# Patient Record
Sex: FEMALE | Race: BLACK OR AFRICAN AMERICAN | NOT HISPANIC OR LATINO | Employment: PART TIME | URBAN - METROPOLITAN AREA
[De-identification: names, ages, dates, MRNs, and addresses within clinical notes are randomized per-mention and may not be internally consistent; named-entity substitution may affect disease eponyms.]

---

## 2017-01-13 ENCOUNTER — OFFICE VISIT (OUTPATIENT)
Dept: FAMILY MEDICINE CLINIC | Facility: CLINIC | Age: 62
End: 2017-01-13

## 2017-01-13 VITALS
DIASTOLIC BLOOD PRESSURE: 78 MMHG | WEIGHT: 198.5 LBS | HEIGHT: 67 IN | SYSTOLIC BLOOD PRESSURE: 110 MMHG | HEART RATE: 71 BPM | RESPIRATION RATE: 16 BRPM | BODY MASS INDEX: 31.16 KG/M2 | OXYGEN SATURATION: 98 % | TEMPERATURE: 97.8 F

## 2017-01-13 DIAGNOSIS — R50.81 FEVER IN OTHER DISEASES: Primary | ICD-10-CM

## 2017-01-13 DIAGNOSIS — R60.9 EDEMA, UNSPECIFIED TYPE: ICD-10-CM

## 2017-01-13 PROCEDURE — 99213 OFFICE O/P EST LOW 20 MIN: CPT | Performed by: INTERNAL MEDICINE

## 2017-01-13 RX ORDER — POTASSIUM CHLORIDE 600 MG/1
8 TABLET, FILM COATED, EXTENDED RELEASE ORAL DAILY
Qty: 20 TABLET | Refills: 2 | Status: SHIPPED | OUTPATIENT
Start: 2017-01-13 | End: 2017-08-08 | Stop reason: SDUPTHER

## 2017-01-13 RX ORDER — FUROSEMIDE 20 MG/1
20 TABLET ORAL 2 TIMES DAILY
Qty: 20 TABLET | Refills: 2 | Status: SHIPPED | OUTPATIENT
Start: 2017-01-13 | End: 2017-09-10 | Stop reason: SDUPTHER

## 2017-01-13 NOTE — PROGRESS NOTES
Subjective   Patient ID: Alysha Sullivan is a 61 y.o. female presents with   Chief Complaint   Patient presents with   • Fever     x's 2 weeks       HPI - this patient presents today said she has had an occasional mild fever at night that resolves with Tylenol.  She's not having any symptoms that would point to where the problem is she has no abdominal pain cough or dysuria.  Overall she feels good.  I recommended that we do chest x-ray blood work and a urinalysis.  She's preparing to see her hematologist Dr. Adams and she defers blood work to him.  Also she had a chest x-ray in September she doesn't want to repeat that.  She will do the urinalysis.  She also has a history of lower extremity edema and she takes Lasix when necessary she requests a prescription for that.  She also says that occasionally she will pass blood.  I recommend she see a gastroenterologist she says she had a colonoscopy last year.  I still recommend she go back and see the gastroenterologist.  She says she will defer to Dr. Adams her hematologist.    Assessment plan    Fever-we'll get a urinalysis recommend other testing but the patient declines and will defer to another physician who she will see soon.  If the patient has any worsening she is to let me know.    Edema Lasix and potassium when necessary swelling.    Allergies   Allergen Reactions   • Ibuprofen    • Sulfa Antibiotics        The following portions of the patient's history were reviewed and updated as appropriate: allergies, current medications, past family history, past medical history, past social history, past surgical history and problem list.      Review of Systems   Constitutional: Positive for fever.   HENT: Negative.    Respiratory: Negative.    Cardiovascular: Positive for leg swelling.        History of chronic lower extremity edema secondary to obesity   Genitourinary: Negative.    Musculoskeletal: Positive for arthralgias and gait problem.       Objective     Vitals:  "   01/13/17 1109   BP: 110/78   Pulse: 71   Resp: 16   Temp: 97.8 °F (36.6 °C)   TempSrc: Oral   SpO2: 98%   Weight: 198 lb 8 oz (90 kg)   Height: 67\" (170.2 cm)         Physical Exam   Constitutional: She is oriented to person, place, and time. She appears well-developed and well-nourished.   HENT:   Head: Normocephalic and atraumatic.   Eyes: EOM are normal.   Neck: Normal range of motion. Neck supple. No thyromegaly present.   Cardiovascular: Normal rate, regular rhythm and normal heart sounds.    Pulmonary/Chest: Effort normal and breath sounds normal.   Abdominal: Soft. She exhibits no distension and no mass. There is no tenderness. There is no rebound and no guarding.   Neurological: She is alert and oriented to person, place, and time.   Psychiatric: She has a normal mood and affect. Her behavior is normal. Judgment and thought content normal.   Nursing note and vitals reviewed.        Alysha was seen today for fever.    Diagnoses and all orders for this visit:    Fever in other diseases  -     Urinalysis (clean catch)    Edema, unspecified type    Other orders  -     furosemide (LASIX) 20 MG tablet; Take 1 tablet by mouth 2 (Two) Times a Day.  -     potassium chloride (KLOR-CON) 8 MEQ CR tablet; Take 1 tablet by mouth Daily.        Call or return to clinic prn if these symptoms worsen or fail to improve as anticipated.  "

## 2017-01-13 NOTE — MR AVS SNAPSHOT
Alysha Sullivan   1/13/2017 9:45 AM   Office Visit    Dept Phone:  [phone]   Encounter #:  [id number]    Provider:  Kunal Stark MD   Department:  [facility] PRIMARY CARE                Your Full Care Plan              Today's Medication Changes          These changes are accurate as of: 1/13/17 11:57 AM.  If you have any questions, ask your nurse or doctor.               New Medication(s)Ordered:     potassium chloride 8 MEQ CR tablet   Commonly known as:  KLOR-CON   Take 1 tablet by mouth Daily.   Started by:  Kunal Stark MD         Medication(s)that have changed:     furosemide 20 MG tablet   Commonly known as:  LASIX   Take 1 tablet by mouth 2 (Two) Times a Day.   What changed:  See the new instructions.   Changed by:  Kunal Stark MD            Where to Get Your Medications      These medications were sent to [address] [address] [facility] [phone] [facility] [phone] [place] [address]     Phone:  [phone]     furosemide 20 MG tablet    potassium chloride 8 MEQ CR tablet                  Your Updated Medication List          This list is accurate as of: 1/13/17 11:57 AM.  Always use your most recent med list.                furosemide 20 MG tablet   Commonly known as:  LASIX   Take 1 tablet by mouth 2 (Two) Times a Day.       Morphine 30 MG 12 hr tablet   Commonly known as:  MS CONTIN       oxyCODONE-acetaminophen 7.5-325 MG per tablet   Commonly known as:  PERCOCET       potassium chloride 8 MEQ CR tablet   Commonly known as:  KLOR-CON   Take 1 tablet by mouth Daily.       senna-docusate 8.6-50 MG per tablet   Commonly known as:  PERICOLACE               We Performed the Following     Urinalysis (clean catch)       You Were Diagnosed With        Codes Comments    Fever in other diseases    -  Primary ICD-10-CM: R50.81     Edema, unspecified type     ICD-10-CM:  R60.9  ICD-9-CM: 782.3       Instructions     None    Patient Instructions History      Upcoming Appointments     Visit Type Date Time Department    OFFICE VISIT 2017  9:45 AM [facility]    LAB 2017 10:20 AM [facility]    FOLLOW UP 1 UNIT 2017 10:40 AM [facility]    VITALS ONLY 2017 10:20 AM  [facility] CBC LAB [facility]      MyChart Signup     [facility] [employer] allows you to send messages to your doctor, view your test results, renew your prescriptions, schedule appointments, and more. To sign up, go to [employer] and click on the Sign Up Now link in the New User? box. Enter your [employer] Activation Code exactly as it appears below along with the last four digits of your Social Security Number and your Date of Birth () to complete the sign-up process. If you do not sign up before the expiration date, you must request a new code.    [employer] Activation Code: [email or web address]  Expires: 2017 11:57 AM    If you have questions, you can email [email or web address] or call [phone] to talk to our [employer] staff. Remember, [employer] is NOT to be used for urgent needs. For medical emergencies, dial 911.               Other Info from Your Visit           Your Appointments     2017 10:20 AM EST   Lab with LAB CHAIR 2 [facility] ONC LAB (--)    [address] [address]   [phone]            2017 10:20 AM EST   Vitals Only with VITALS ONLY CBC [facility] ONC LAB (--)    [address] [place] [id number] [phone]            2017 10:40 AM EST   FOLLOW UP with Mil Schneider II, MD   [facility] BLOOD DISORDERS AND CANCER ([facility])    [address] [address] [phone]              Allergies     Ibuprofen      Sulfa Antibiotics       "  Reason for Visit     Fever x's 2 weeks      Vital Signs     Blood Pressure Pulse Temperature Respirations Height Weight    110/78 71 97.8 °F (36.6 °C) (Oral) 16 67\" (170.2 cm) 198 lb 8 oz (90 kg)    Oxygen Saturation Body Mass Index Smoking Status             98% 31.09 kg/m2 Never Smoker         Problems and Diagnoses Noted     Accumulation of fluid in tissues    Fever    -  Primary        "

## 2017-01-18 ENCOUNTER — LAB (OUTPATIENT)
Dept: LAB | Facility: HOSPITAL | Age: 62
End: 2017-01-18

## 2017-01-18 ENCOUNTER — APPOINTMENT (OUTPATIENT)
Dept: ONCOLOGY | Facility: HOSPITAL | Age: 62
End: 2017-01-18

## 2017-01-18 DIAGNOSIS — D50.8 OTHER IRON DEFICIENCY ANEMIA: Primary | ICD-10-CM

## 2017-01-18 LAB
BASOPHILS # BLD AUTO: 0.02 10*3/MM3 (ref 0–0.1)
BASOPHILS NFR BLD AUTO: 0.7 % (ref 0–1.1)
DEPRECATED RDW RBC AUTO: 46.5 FL (ref 37–49)
EOSINOPHIL # BLD AUTO: 0.11 10*3/MM3 (ref 0–0.36)
EOSINOPHIL NFR BLD AUTO: 3.9 % (ref 1–5)
ERYTHROCYTE [DISTWIDTH] IN BLOOD BY AUTOMATED COUNT: 15.4 % (ref 11.7–14.5)
FERRITIN SERPL-MCNC: 8 NG/ML (ref 11–207)
HCT VFR BLD AUTO: 33 % (ref 34–45)
HGB BLD-MCNC: 9.8 G/DL (ref 11.5–14.9)
HGB RETIC QN: 24.1 PG (ref 29.8–36.1)
IMM GRANULOCYTES # BLD: 0.01 10*3/MM3 (ref 0–0.03)
IMM GRANULOCYTES NFR BLD: 0.4 % (ref 0–0.5)
IMM RETICS NFR: 15.5 % (ref 3–15.8)
IRON 24H UR-MRATE: 22 MCG/DL (ref 37–145)
LYMPHOCYTES # BLD AUTO: 1.16 10*3/MM3 (ref 1–3.5)
LYMPHOCYTES NFR BLD AUTO: 40.8 % (ref 20–49)
MCH RBC QN AUTO: 24.5 PG (ref 27–33)
MCHC RBC AUTO-ENTMCNC: 29.7 G/DL (ref 32–35)
MCV RBC AUTO: 82.5 FL (ref 83–97)
MONOCYTES # BLD AUTO: 0.32 10*3/MM3 (ref 0.25–0.8)
MONOCYTES NFR BLD AUTO: 11.3 % (ref 4–12)
NEUTROPHILS # BLD AUTO: 1.22 10*3/MM3 (ref 1.5–7)
NEUTROPHILS NFR BLD AUTO: 42.9 % (ref 39–75)
NRBC BLD MANUAL-RTO: 0 /100 WBC (ref 0–0)
PLATELET # BLD AUTO: 268 10*3/MM3 (ref 150–375)
PMV BLD AUTO: 9.3 FL (ref 8.9–12.1)
RBC # BLD AUTO: 4 10*6/MM3 (ref 3.9–5)
RETICS/RBC NFR AUTO: 1.05 % (ref 0.6–2)
WBC NRBC COR # BLD: 2.84 10*3/MM3 (ref 4–10)

## 2017-01-18 PROCEDURE — 83540 ASSAY OF IRON: CPT

## 2017-01-18 PROCEDURE — 85046 RETICYTE/HGB CONCENTRATE: CPT

## 2017-01-18 PROCEDURE — 85025 COMPLETE CBC W/AUTO DIFF WBC: CPT

## 2017-01-18 PROCEDURE — 36415 COLL VENOUS BLD VENIPUNCTURE: CPT | Performed by: INTERNAL MEDICINE

## 2017-01-18 PROCEDURE — 82728 ASSAY OF FERRITIN: CPT

## 2017-01-24 PROBLEM — D50.9 IRON DEFICIENCY ANEMIA: Status: ACTIVE | Noted: 2017-01-24

## 2017-01-25 ENCOUNTER — OFFICE VISIT (OUTPATIENT)
Dept: ONCOLOGY | Facility: CLINIC | Age: 62
End: 2017-01-25

## 2017-01-25 ENCOUNTER — APPOINTMENT (OUTPATIENT)
Dept: ONCOLOGY | Facility: HOSPITAL | Age: 62
End: 2017-01-25

## 2017-01-25 VITALS
SYSTOLIC BLOOD PRESSURE: 110 MMHG | RESPIRATION RATE: 16 BRPM | TEMPERATURE: 98.4 F | OXYGEN SATURATION: 94 % | WEIGHT: 192.9 LBS | HEIGHT: 67 IN | HEART RATE: 73 BPM | BODY MASS INDEX: 30.28 KG/M2 | DIASTOLIC BLOOD PRESSURE: 70 MMHG

## 2017-01-25 DIAGNOSIS — D50.9 IRON DEFICIENCY ANEMIA, UNSPECIFIED IRON DEFICIENCY ANEMIA TYPE: Primary | ICD-10-CM

## 2017-01-25 PROCEDURE — 99215 OFFICE O/P EST HI 40 MIN: CPT | Performed by: INTERNAL MEDICINE

## 2017-01-25 PROCEDURE — G0463 HOSPITAL OUTPT CLINIC VISIT: HCPCS | Performed by: INTERNAL MEDICINE

## 2017-01-25 NOTE — PROGRESS NOTES
Subjective .     REASONS FOR FOLLOWUP:  Iron deficiency anemia    HISTORY OF PRESENT ILLNESS:  The patient is a 61 y.o. year old female  who is here for follow-up with the above-mentioned history.  Last seen by me 11/9/15.  Asked to return 3 months later.  Patient saw PCP, Dr. Stark, 1/13/17 4 mild nightly fevers or, that resolved with Tylenol.  He recommended chest x-ray blood work and urinalysis.  She declined a chest x-ray because she had this September 2016.  He noted she did agree to the urinalysis.  Patient told him she occasionally has blood in stools.  He recommended gastroenterology referral.  She declined due to colonoscopy 1 year ago.  On 1/18/17, ferritin 8 hemoglobin 9.8.  WBC 2.8.  .    She states she's been fatigued for several months.  She wonders if this may be due to recurrent iron deficiency anemia.  She very much wants IV iron.  She understands there is a risk of allergic reactions.    Denies chest pain.  Denies shortness of air.  Denies dizziness.  She tells me today she has had bright red blood in her bowel movements ×2-3 months.  States this blood is not mixed in the stools.  States the stools are brown.  She is now agreeable to seeing Dr. Delcid, her gastroenterologist.      Denies unintentional significant weight loss  Denies drenching night sweats.    States for the past 6-7 months she has had subjective fevers between 11 PM and midnight while she is at work.  She has not checked her temperature to confirm these are actually fevers.  Has had no sensation of subjective fevers at other times of the day.  Does not feel this is getting any worse.     Past Medical History   Diagnosis Date   • Anemia      Iron deficiency; B12 deficiency   • History of morbid obesity    • Leukocytopenia    • Osteoarthritis      Past Surgical History   Procedure Laterality Date   • Gastric bypass  2001   • Dilatation and curettage  11/2009       HEMATOLOGIC/ONCOLOGIC HISTORY:  (History from previous dates  can be found in the separate document.)    MEDICATIONS    Current Outpatient Prescriptions:   •  furosemide (LASIX) 20 MG tablet, Take 1 tablet by mouth 2 (Two) Times a Day., Disp: 20 tablet, Rfl: 2  •  morphine (MS CONTIN) 30 MG 12 hr tablet, Take  by mouth., Disp: , Rfl:   •  oxyCODONE-acetaminophen (PERCOCET) 7.5-325 MG per tablet, Take  by mouth., Disp: , Rfl:   •  potassium chloride (KLOR-CON) 8 MEQ CR tablet, Take 1 tablet by mouth Daily., Disp: 20 tablet, Rfl: 2  •  senna-docusate (PERICOLACE) 8.6-50 MG per tablet, Take 1 tablet by mouth daily., Disp: , Rfl:     ALLERGIES:     Allergies   Allergen Reactions   • Ibuprofen    • Sulfa Antibiotics        SOCIAL HISTORY:       Social History     Social History   • Marital status:      Spouse name: Timothy   • Number of children: 3   • Years of education: Masters Degree     Occupational History   • [employer] supervisor Unemployed     Social History Main Topics   • Smoking status: Never Smoker   • Smokeless tobacco: Never Used   • Alcohol use Yes   • Drug use: No   • Sexual activity: Defer     Other Topics Concern   • Not on file     Social History Narrative         FAMILY HISTORY:  Family History   Problem Relation Age of Onset   • No Known Problems Mother    • No Known Problems Father        REVIEW OF SYSTEMS:  GENERAL: No change in appetite or weight;   SKIN: No nonhealing lesions.   No rashes.  HEME/LYMPH: No easy bruising, bleeding.   No swollen nodes.   EYES: No vision changes or diplopia.   ENT: No tinnitus, hearing loss, gum bleeding, epistaxis, hoarseness or dysphagia.   RESPIRATORY: No cough, shortness of breath, hemoptysis or wheezing.   CVS: No chest pain, palpitations, orthopnea, dyspnea on exertion or PND.   GI: No melena or hematochezia.   No abdominal pain.  No nausea, vomiting, constipation, diarrhea  : No lower tract obstructive symptoms, dysuria or hematuria.   MUSCULOSKELETAL: No bone pain.  No joint stiffness.   NEUROLOGICAL: No global  "weakness, loss of consciousness or seizures.   PSYCHIATRIC: No increased nervousness, mood changes or depression.     Objective    Vitals:    01/25/17 1039   BP: 110/70   Pulse: 73   Resp: 16   Temp: 98.4 °F (36.9 °C)   TempSrc: Oral   SpO2: 94%   Weight: 192 lb 14.4 oz (87.5 kg)   Height: 67\" (170.2 cm)   PainSc: 0-No pain     Current Status 1/25/2017   ECOG score 1      PHYSICAL EXAM:    GENERAL:  Well-developed, well-nourished in no acute distress.   SKIN:  Warm, dry without rashes, purpura or petechiae.  EYES:  Pupils equal, round and reactive to light.  EOMs intact.  Conjunctivae normal.  EARS:  Hearing intact.  NOSE:  Septum midline.  No excoriations or nasal discharge.  MOUTH:  Tongue is well-papillated; no stomatitis or ulcers.  Lips normal.  THROAT:  Oropharynx without lesions or exudates.  NECK:  Supple with good range of motion; no thyromegaly or masses, no JVD.  LYMPHATICS:  No cervical, supraclavicular, axillary or inguinal adenopathy.  CHEST:  Lungs clear to auscultation. Good airflow.  CARDIAC:  Regular rate and rhythm without murmurs, rubs or gallops. Normal S1,S2.  ABDOMEN:  Soft, nontender with no hepatosplenomegaly or masses.  EXTREMITIES:  No clubbing, cyanosis or edema.  NEUROLOGICAL:  Cranial Nerves II-XII grossly intact.  No focal neurological deficits.  PSYCHIATRIC:  Normal affect and mood.     RECENT LABS:        WBC   Date/Time Value Ref Range Status   01/18/2017 11:06 AM 2.84 (L) 4.00 - 10.00 10*3/mm3 Final   12/09/2015 08:58 AM 3.1 (L) 3.4 - 10.8 x10E3/uL Final     HEMOGLOBIN   Date/Time Value Ref Range Status   01/18/2017 11:06 AM 9.8 (L) 11.5 - 14.9 g/dL Final   12/09/2015 08:58 AM 11.6 11.1 - 15.9 g/dL Final     PLATELETS   Date/Time Value Ref Range Status   01/18/2017 11:06  150 - 375 10*3/mm3 Final   12/09/2015 08:58  150 - 379 x10E3/uL Final       Assessment/Plan   Iron deficiency anemia, unspecified iron deficiency anemia type  - Ambulatory Referral to " Gastroenterology  - CBC & Differential  - Ferritin  - Iron Profile  - Retic With IRF & RET-He  1.  Iron deficiency anemia. History of gastric bypass surgery and history of IV iron.   Dr. Delcid evaluated her on 05/08/2015 and felt she needed no further GI evaluation.   We will labs from 1/18/17: Ferritin 8, hemoglobin 9.8.  Plan 2 doses Feraheme.       2.  Leukocytopenia and neutropenia. This may in part be related to her ethnicity as she is .     3.  B12 deficiency.  was previously on oral B12.  I do not see that she is on this now.  Check a B12 level when we recheck her iron labs in 3 months.      4.  Recurrent subjective fevers between 11 PM and midnight every day since mid 2016.  I suggested she actually check her temperature with a thermometer.  She has no other concerning findings to prompt a CT scan or bone marrow biopsy to look for malignancy as a source of fevers.  She states she does not want these tests performed anyway.  I would defer evaluation for infectious causes of fevers to her PCP.  This was a new issue for me to address today.     PLAN:   MD 3 months with 1 week prior: Iron labs, B12   2 doses Feraheme

## 2017-01-27 PROBLEM — K91.2 OTHER AND UNSPECIFIED POSTSURGICAL NONABSORPTION: Status: ACTIVE | Noted: 2017-01-27

## 2017-01-31 ENCOUNTER — APPOINTMENT (OUTPATIENT)
Dept: ONCOLOGY | Facility: HOSPITAL | Age: 62
End: 2017-01-31

## 2017-02-02 ENCOUNTER — INFUSION (OUTPATIENT)
Dept: ONCOLOGY | Facility: HOSPITAL | Age: 62
End: 2017-02-02

## 2017-02-02 VITALS
HEART RATE: 78 BPM | BODY MASS INDEX: 30.26 KG/M2 | DIASTOLIC BLOOD PRESSURE: 80 MMHG | WEIGHT: 193.2 LBS | SYSTOLIC BLOOD PRESSURE: 124 MMHG | TEMPERATURE: 98.5 F

## 2017-02-02 DIAGNOSIS — K91.2 OTHER AND UNSPECIFIED POSTSURGICAL NONABSORPTION: Primary | ICD-10-CM

## 2017-02-02 PROCEDURE — 25010000002 FERUMOXYTOL 510 MG/17ML SOLUTION 510 MG VIAL: Performed by: INTERNAL MEDICINE

## 2017-02-02 PROCEDURE — 63710000001 DIPHENHYDRAMINE PER 50 MG: Performed by: INTERNAL MEDICINE

## 2017-02-02 PROCEDURE — 96374 THER/PROPH/DIAG INJ IV PUSH: CPT | Performed by: INTERNAL MEDICINE

## 2017-02-02 PROCEDURE — 96375 TX/PRO/DX INJ NEW DRUG ADDON: CPT | Performed by: INTERNAL MEDICINE

## 2017-02-02 RX ORDER — FAMOTIDINE 10 MG/ML
20 INJECTION, SOLUTION INTRAVENOUS ONCE
Status: COMPLETED | OUTPATIENT
Start: 2017-02-02 | End: 2017-02-02

## 2017-02-02 RX ORDER — SODIUM CHLORIDE 9 MG/ML
250 INJECTION, SOLUTION INTRAVENOUS ONCE
Status: COMPLETED | OUTPATIENT
Start: 2017-02-02 | End: 2017-02-02

## 2017-02-02 RX ORDER — DIPHENHYDRAMINE HCL 25 MG
25 CAPSULE ORAL ONCE
Status: COMPLETED | OUTPATIENT
Start: 2017-02-02 | End: 2017-02-02

## 2017-02-02 RX ADMIN — FAMOTIDINE 20 MG: 10 INJECTION, SOLUTION INTRAVENOUS at 10:08

## 2017-02-02 RX ADMIN — FERUMOXYTOL 510 MG: 510 INJECTION INTRAVENOUS at 10:43

## 2017-02-02 RX ADMIN — DIPHENHYDRAMINE HYDROCHLORIDE 25 MG: 25 CAPSULE ORAL at 10:08

## 2017-02-02 RX ADMIN — SODIUM CHLORIDE 250 ML: 900 INJECTION, SOLUTION INTRAVENOUS at 10:08

## 2017-02-07 ENCOUNTER — APPOINTMENT (OUTPATIENT)
Dept: ONCOLOGY | Facility: HOSPITAL | Age: 62
End: 2017-02-07

## 2017-02-09 ENCOUNTER — APPOINTMENT (OUTPATIENT)
Dept: ONCOLOGY | Facility: HOSPITAL | Age: 62
End: 2017-02-09

## 2017-02-09 DIAGNOSIS — E78.5 HYPERLIPIDEMIA, UNSPECIFIED HYPERLIPIDEMIA TYPE: ICD-10-CM

## 2017-02-09 DIAGNOSIS — Z00.00 HEALTH CARE MAINTENANCE: ICD-10-CM

## 2017-02-09 DIAGNOSIS — E55.9 VITAMIN D DEFICIENCY: Primary | ICD-10-CM

## 2017-02-16 ENCOUNTER — TELEPHONE (OUTPATIENT)
Dept: FAMILY MEDICINE CLINIC | Facility: CLINIC | Age: 62
End: 2017-02-16

## 2017-02-16 RX ORDER — SODIUM CHLORIDE 9 MG/ML
250 INJECTION, SOLUTION INTRAVENOUS ONCE
Status: CANCELLED | OUTPATIENT
Start: 2017-02-17

## 2017-02-16 RX ORDER — FAMOTIDINE 10 MG/ML
20 INJECTION, SOLUTION INTRAVENOUS ONCE
Status: CANCELLED | OUTPATIENT
Start: 2017-02-17

## 2017-02-16 RX ORDER — DIPHENHYDRAMINE HCL 25 MG
25 CAPSULE ORAL ONCE
Status: CANCELLED | OUTPATIENT
Start: 2017-02-17

## 2017-02-16 NOTE — TELEPHONE ENCOUNTER
Mariajose, i know there was confusion when she came in on 2/9 did she get the labs done? This is in your overdue task. thanks

## 2017-03-06 ENCOUNTER — TELEPHONE (OUTPATIENT)
Dept: FAMILY MEDICINE CLINIC | Facility: CLINIC | Age: 62
End: 2017-03-06

## 2017-03-06 NOTE — TELEPHONE ENCOUNTER
Alysha said that despite her taking her lasix and potassium daily her legs are still swelling and now her hands are starting to also. She wants to know what should she do?

## 2017-03-06 NOTE — TELEPHONE ENCOUNTER
Left message informing patient that she needs to be seen and we could fit her in as early as tomorrow if she can make it.

## 2017-04-11 ENCOUNTER — OFFICE VISIT (OUTPATIENT)
Dept: FAMILY MEDICINE CLINIC | Facility: CLINIC | Age: 62
End: 2017-04-11

## 2017-04-11 VITALS
WEIGHT: 194.4 LBS | TEMPERATURE: 97.5 F | HEIGHT: 67 IN | RESPIRATION RATE: 16 BRPM | SYSTOLIC BLOOD PRESSURE: 100 MMHG | OXYGEN SATURATION: 94 % | HEART RATE: 56 BPM | BODY MASS INDEX: 30.51 KG/M2 | DIASTOLIC BLOOD PRESSURE: 60 MMHG

## 2017-04-11 DIAGNOSIS — N30.00 ACUTE CYSTITIS WITHOUT HEMATURIA: ICD-10-CM

## 2017-04-11 DIAGNOSIS — N39.0 FREQUENT UTI: ICD-10-CM

## 2017-04-11 DIAGNOSIS — L72.0 EPIDERMOID CYST: Primary | ICD-10-CM

## 2017-04-11 PROCEDURE — 99213 OFFICE O/P EST LOW 20 MIN: CPT | Performed by: INTERNAL MEDICINE

## 2017-04-11 RX ORDER — CIPROFLOXACIN 250 MG/1
250 TABLET, FILM COATED ORAL 2 TIMES DAILY
Qty: 14 TABLET | Refills: 0 | Status: SHIPPED | OUTPATIENT
Start: 2017-04-11 | End: 2017-04-19

## 2017-04-11 NOTE — PROGRESS NOTES
"Subjective   Patient ID: Alysha Sullivan is a 62 y.o. female presents with   Chief Complaint   Patient presents with   • Urinary Tract Infection     burning while urinating   • infected hair follicle     sore to the touch and itchy, filled with puss on her back       HPI - this patient has multiple medical problems she comes in today with ongoing urinary tract inspection she had a UTI and was seen at [facility] and cultured out Escherichia coli sensitive to Cipro.  She was put on Cipro and her symptoms resolved but then came back.  She describes her pain as dysuria and frequency.  Also has a in infected sebaceous cyst on her back.  It's aggravated for quite a while and she like this taken care of.    Assessment plan    Epidermoid cyst appointment with Gen. surgery for excision    Acute cystitis Cipro 250 twice daily for 7 days    Frequent UTIs appointment with gynecology.        Allergies   Allergen Reactions   • Ibuprofen    • Sulfa Antibiotics        The following portions of the patient's history were reviewed and updated as appropriate: allergies, current medications, past family history, past medical history, past social history, past surgical history and problem list.      Review of Systems   Constitutional: Positive for fatigue. Negative for fever.   HENT: Negative.    Respiratory: Negative.    Cardiovascular: Negative.    Gastrointestinal: Negative.    Genitourinary: Positive for dysuria and frequency. Negative for flank pain.   Musculoskeletal: Positive for arthralgias.       Objective     Vitals:    04/11/17 0938   BP: 100/60   Pulse: 56   Resp: 16   Temp: 97.5 °F (36.4 °C)   TempSrc: Oral   SpO2: 94%   Weight: 194 lb 6.4 oz (88.2 kg)   Height: 67\" (170.2 cm)         Physical Exam   Constitutional: She is oriented to person, place, and time. She appears well-developed and well-nourished.   HENT:   Head: Normocephalic and atraumatic.   Eyes: EOM are normal. Pupils are equal, round, and reactive to " light.   Neurological: She is alert and oriented to person, place, and time.   Skin:   Large lesion on the back suggestive of a epidermoid cyst that is infected or irritated.   Psychiatric: She has a normal mood and affect. Her behavior is normal.   Nursing note and vitals reviewed.        Alysha was seen today for urinary tract infection and infected hair follicle.    Diagnoses and all orders for this visit:    Epidermoid cyst  -     Ambulatory Referral to General Surgery    Acute cystitis without hematuria    Frequent UTI  -     Ambulatory Referral to Gynecology    Other orders  -     ciprofloxacin (CIPRO) 250 MG tablet; Take 1 tablet by mouth 2 (Two) Times a Day.        Call or return to clinic prn if these symptoms worsen or fail to improve as anticipated.

## 2017-04-13 ENCOUNTER — TELEPHONE (OUTPATIENT)
Dept: GENERAL RADIOLOGY | Facility: HOSPITAL | Age: 62
End: 2017-04-13

## 2017-04-13 NOTE — TELEPHONE ENCOUNTER
----- Message from Michelle Rankin sent at 4/13/2017  1:49 PM EDT -----  Regarding: missed lab    4/19 appointment

## 2017-04-19 ENCOUNTER — TELEPHONE (OUTPATIENT)
Dept: ONCOLOGY | Facility: CLINIC | Age: 62
End: 2017-04-19

## 2017-04-19 ENCOUNTER — OFFICE VISIT (OUTPATIENT)
Dept: ONCOLOGY | Facility: CLINIC | Age: 62
End: 2017-04-19

## 2017-04-19 VITALS
SYSTOLIC BLOOD PRESSURE: 115 MMHG | TEMPERATURE: 98.2 F | HEART RATE: 73 BPM | DIASTOLIC BLOOD PRESSURE: 78 MMHG | BODY MASS INDEX: 35.87 KG/M2 | RESPIRATION RATE: 16 BRPM | HEIGHT: 62 IN | WEIGHT: 194.9 LBS | OXYGEN SATURATION: 98 %

## 2017-04-19 DIAGNOSIS — D50.9 IRON DEFICIENCY ANEMIA, UNSPECIFIED IRON DEFICIENCY ANEMIA TYPE: Primary | ICD-10-CM

## 2017-04-19 PROCEDURE — 82607 VITAMIN B-12: CPT

## 2017-04-19 PROCEDURE — 85007 BL SMEAR W/DIFF WBC COUNT: CPT

## 2017-04-19 PROCEDURE — 84466 ASSAY OF TRANSFERRIN: CPT

## 2017-04-19 PROCEDURE — 82728 ASSAY OF FERRITIN: CPT

## 2017-04-19 PROCEDURE — 99214 OFFICE O/P EST MOD 30 MIN: CPT | Performed by: INTERNAL MEDICINE

## 2017-04-19 PROCEDURE — 83540 ASSAY OF IRON: CPT

## 2017-04-19 PROCEDURE — 85025 COMPLETE CBC W/AUTO DIFF WBC: CPT

## 2017-04-19 PROCEDURE — 85046 RETICYTE/HGB CONCENTRATE: CPT

## 2017-04-19 RX ORDER — DIPHENHYDRAMINE HCL 25 MG
25 CAPSULE ORAL ONCE
OUTPATIENT
Start: 2017-04-26

## 2017-04-19 RX ORDER — SODIUM CHLORIDE 9 MG/ML
250 INJECTION, SOLUTION INTRAVENOUS ONCE
OUTPATIENT
Start: 2017-04-26

## 2017-04-19 RX ORDER — SENNA AND DOCUSATE SODIUM 50; 8.6 MG/1; MG/1
1 TABLET, FILM COATED ORAL DAILY
COMMUNITY

## 2017-04-19 NOTE — TELEPHONE ENCOUNTER
----- Message from Mil Schneider II, MD sent at 4/19/2017 12:48 PM EDT -----   Please tell her iron is low.  Arrange 2 doses Feraheme.  Thank you    Message sent to appt desk to schedule.

## 2017-04-19 NOTE — PROGRESS NOTES
Subjective .     REASONS FOR FOLLOWUP:  Iron deficiency anemia    HISTORY OF PRESENT ILLNESS:  The patient is a 62 y.o. year old female  who is here for follow-up with the above-mentioned history.    Denies chest pain.  Denies shortness of air.  Denies dizziness.  Denies weakness.  Denies bleeding from any location.    Forgot to have her labs done.    Past Medical History:   Diagnosis Date   • Anemia     Iron deficiency; B12 deficiency   • History of morbid obesity    • Leukocytopenia    • Osteoarthritis      Past Surgical History:   Procedure Laterality Date   • DILATATION AND CURETTAGE  11/2009   • GASTRIC BYPASS  2001       HEMATOLOGIC/ONCOLOGIC HISTORY:  (History from previous dates can be found in the separate document.)    MEDICATIONS    Current Outpatient Prescriptions:   •  furosemide (LASIX) 20 MG tablet, Take 1 tablet by mouth 2 (Two) Times a Day., Disp: 20 tablet, Rfl: 2  •  morphine (MS CONTIN) 30 MG 12 hr tablet, Take  by mouth., Disp: , Rfl:   •  oxyCODONE-acetaminophen (PERCOCET) 7.5-325 MG per tablet, Take  by mouth., Disp: , Rfl:   •  potassium chloride (KLOR-CON) 8 MEQ CR tablet, Take 1 tablet by mouth Daily., Disp: 20 tablet, Rfl: 2  •  sennosides-docusate sodium (SENOKOT-S) 8.6-50 MG tablet, Take 1 tablet by mouth Daily., Disp: , Rfl:     ALLERGIES:     Allergies   Allergen Reactions   • Ibuprofen    • Sulfa Antibiotics        SOCIAL HISTORY:       Social History     Social History   • Marital status:      Spouse name: Timothy   • Number of children: 3   • Years of education: Masters Degree     Occupational History   • [employer] supervisor Unemployed     Social History Main Topics   • Smoking status: Never Smoker   • Smokeless tobacco: Never Used   • Alcohol use Yes   • Drug use: No   • Sexual activity: Defer     Other Topics Concern   • Not on file     Social History Narrative         FAMILY HISTORY:  Family History   Problem Relation Age of Onset   • No Known Problems Mother    • No Known  "Problems Father        REVIEW OF SYSTEMS:  GENERAL: No change in appetite or weight;   SKIN: No nonhealing lesions.   No rashes.  HEME/LYMPH: No easy bruising, bleeding.   No swollen nodes.   EYES: No vision changes or diplopia.   ENT: No tinnitus, hearing loss, gum bleeding, epistaxis, hoarseness or dysphagia.   RESPIRATORY: No cough, shortness of breath, hemoptysis or wheezing.   CVS: No chest pain, palpitations, orthopnea, dyspnea on exertion or PND.   GI: No melena or hematochezia.   No abdominal pain.  No nausea, vomiting, constipation, diarrhea  : No lower tract obstructive symptoms, dysuria or hematuria.   MUSCULOSKELETAL: No bone pain.  No joint stiffness.   NEUROLOGICAL: No global weakness, loss of consciousness or seizures.   PSYCHIATRIC: No increased nervousness, mood changes or depression.     Objective    Vitals:    04/19/17 0940   BP: 115/78   Pulse: 73   Resp: 16   Temp: 98.2 °F (36.8 °C)   TempSrc: Oral   SpO2: 98%   Weight: 194 lb 14.4 oz (88.4 kg)   Height: 62.21\" (158 cm)  Comment: new ht   PainSc:   8   PainLoc: Knee  Comment: lt     Current Status 1/25/2017   ECOG score 1      PHYSICAL EXAM:    GENERAL:  Well-developed, well-nourished in no acute distress.   SKIN:  Warm, dry without rashes, purpura or petechiae.  EYES:  Pupils equal, round and reactive to light.  EOMs intact.  Conjunctivae normal.  EARS:  Hearing intact.  NOSE:  Septum midline.  No excoriations or nasal discharge.  MOUTH:  Tongue is well-papillated; no stomatitis or ulcers.  Lips normal.  THROAT:  Oropharynx without lesions or exudates.  NECK:  Supple with good range of motion; no thyromegaly or masses, no JVD.  LYMPHATICS:  No cervical, supraclavicular, axillary or inguinal adenopathy.  CHEST:  Lungs clear to auscultation. Good airflow.  CARDIAC:  Regular rate and rhythm without murmurs, rubs or gallops. Normal S1,S2.  ABDOMEN:  Soft, nontender with no hepatosplenomegaly or masses.  EXTREMITIES:  No clubbing, cyanosis or " edema.  NEUROLOGICAL:  Cranial Nerves II-XII grossly intact.  No focal neurological deficits.  PSYCHIATRIC:  Normal affect and mood.     RECENT LABS:        WBC   Date/Time Value Ref Range Status   01/18/2017 11:06 AM 2.84 (L) 4.00 - 10.00 10*3/mm3 Final     Hemoglobin   Date/Time Value Ref Range Status   01/18/2017 11:06 AM 9.8 (L) 11.5 - 14.9 g/dL Final     Platelets   Date/Time Value Ref Range Status   01/18/2017 11:06  150 - 375 10*3/mm3 Final       Assessment/Plan   There are no diagnoses linked to this encounter.  1.  Iron deficiency anemia. History of gastric bypass surgery and history of IV iron.   Dr. Delcid evaluated her on 05/08/2015 and felt she needed no further GI evaluation.   She forgot to have labs done last week.  Labs will be done today.       2.  Leukocytopenia and neutropenia. This may in part be related to her ethnicity as she is .   Await labs.    3.  B12 deficiency.  was previously on oral B12.  She has been off of this for some time now.    Await today's B12 result.    4.  On a prior visit, complained of Recurrent subjective fevers between 11 PM and midnight every day since mid 2016.  I suggested she actually check her temperature with a thermometer.  She has no other concerning findings to prompt a CT scan or bone marrow biopsy to look for malignancy as a source of fevers.  She states she does not want these tests performed anyway.  I would defer evaluation for infectious causes of fevers to her PCP.  No complaints of this today.     PLAN:   MD 3 months with 1 week prior: Iron labs, B12   Labs today to determine if she needs to begin oral B12 and if she needs IV iron.    ADDENDUM 4/19/17 AT 12:49 PM  Iron is low.  Arrange 2 doses Feraheme.  B12 okay at 303.  Remain off B12.

## 2017-05-22 ENCOUNTER — OFFICE VISIT (OUTPATIENT)
Dept: FAMILY MEDICINE CLINIC | Facility: CLINIC | Age: 62
End: 2017-05-22

## 2017-05-22 VITALS
HEIGHT: 62 IN | SYSTOLIC BLOOD PRESSURE: 100 MMHG | BODY MASS INDEX: 35.53 KG/M2 | OXYGEN SATURATION: 98 % | HEART RATE: 61 BPM | WEIGHT: 193.1 LBS | TEMPERATURE: 97.6 F | RESPIRATION RATE: 18 BRPM | DIASTOLIC BLOOD PRESSURE: 62 MMHG

## 2017-05-22 DIAGNOSIS — L03.115 CELLULITIS OF RIGHT LOWER EXTREMITY: ICD-10-CM

## 2017-05-22 DIAGNOSIS — D50.8 OTHER IRON DEFICIENCY ANEMIA: Primary | ICD-10-CM

## 2017-05-22 PROCEDURE — 99213 OFFICE O/P EST LOW 20 MIN: CPT | Performed by: INTERNAL MEDICINE

## 2017-07-28 ENCOUNTER — OFFICE VISIT (OUTPATIENT)
Dept: FAMILY MEDICINE CLINIC | Facility: CLINIC | Age: 62
End: 2017-07-28

## 2017-07-28 VITALS
DIASTOLIC BLOOD PRESSURE: 62 MMHG | SYSTOLIC BLOOD PRESSURE: 110 MMHG | OXYGEN SATURATION: 94 % | RESPIRATION RATE: 16 BRPM | WEIGHT: 200 LBS | TEMPERATURE: 98 F | HEART RATE: 63 BPM | HEIGHT: 62 IN | BODY MASS INDEX: 36.8 KG/M2

## 2017-07-28 DIAGNOSIS — M25.562 CHRONIC PAIN OF BOTH KNEES: ICD-10-CM

## 2017-07-28 DIAGNOSIS — D50.9 IRON DEFICIENCY ANEMIA, UNSPECIFIED IRON DEFICIENCY ANEMIA TYPE: ICD-10-CM

## 2017-07-28 DIAGNOSIS — G89.29 CHRONIC PAIN OF BOTH KNEES: ICD-10-CM

## 2017-07-28 DIAGNOSIS — M25.561 CHRONIC PAIN OF BOTH KNEES: ICD-10-CM

## 2017-07-28 DIAGNOSIS — R93.89 ABNORMAL CT OF THE CHEST: Primary | ICD-10-CM

## 2017-07-28 PROCEDURE — 99213 OFFICE O/P EST LOW 20 MIN: CPT | Performed by: INTERNAL MEDICINE

## 2017-07-28 NOTE — PROGRESS NOTES
"Subjective   Patient ID: Alysha Sullivan is a 62 y.o. female presents with   Chief Complaint   Patient presents with   • Follow-up     from [facility]    • Refer to Dr. Eden     Pain Managment   • Refer to Pulmonary     Neves Marleni say something in her lung and said she needed to go to pulmonary       HPI - This patient went to [facility] for posterior chest pain a ruled her out for PE incidentally found reactive mediastinal lymph nodes and a groundglass pattern in her lung parenchyma.  She's no longer having pain and she has no shortness of breath.  She also needs a referral to hematology and pain management for her chronic knee pain.    Assessment plan    Abnormal CT of the chest we will refer her on to pulmonary for further evaluation and treatment    Long history of iron deficiency anemia secondary to her having had a gastric bypass.  She's had iron transfusions in the past.    Chronic knee pain her pain management doctor left pounds she wants a new pain management doctor.    Allergies   Allergen Reactions   • Ibuprofen    • Sulfa Antibiotics        The following portions of the patient's history were reviewed and updated as appropriate: allergies, current medications, past family history, past medical history, past social history, past surgical history and problem list.      Review of Systems   Constitutional: Positive for fatigue.   Respiratory: Negative for chest tightness.    Cardiovascular: Positive for chest pain.   Gastrointestinal: Negative.    Musculoskeletal: Positive for arthralgias, back pain and myalgias.       Objective     Vitals:    07/28/17 1306   BP: 110/62   Pulse: 63   Resp: 16   Temp: 98 °F (36.7 °C)   TempSrc: Oral   SpO2: 94%   Weight: 200 lb (90.7 kg)   Height: 62\" (157.5 cm)         Physical Exam   Constitutional: She is oriented to person, place, and time. She appears well-developed and well-nourished.   Cardiovascular: Normal rate, regular rhythm and " normal heart sounds.    Pulmonary/Chest: Effort normal and breath sounds normal.   Neurological: She is alert and oriented to person, place, and time.   Nursing note and vitals reviewed.        Alysha was seen today for follow-up, refer to dr. mcfadden and refer to pulmonary.    Diagnoses and all orders for this visit:    Abnormal CT of the chest  -     Ambulatory Referral to Pulmonology    Iron deficiency anemia, unspecified iron deficiency anemia type  -     Ambulatory Referral to Hematology    Chronic pain of both knees  -     Ambulatory Referral to Pain Management        Call or return to clinic prn if these symptoms worsen or fail to improve as anticipated.

## 2017-08-04 ENCOUNTER — TELEPHONE (OUTPATIENT)
Dept: FAMILY MEDICINE CLINIC | Facility: CLINIC | Age: 62
End: 2017-08-04

## 2017-08-04 RX ORDER — CLINDAMYCIN HYDROCHLORIDE 150 MG/1
150 CAPSULE ORAL 3 TIMES DAILY
Qty: 3 CAPSULE | Refills: 0 | Status: SHIPPED | OUTPATIENT
Start: 2017-08-04 | End: 2017-09-10 | Stop reason: SDUPTHER

## 2017-08-04 NOTE — TELEPHONE ENCOUNTER
Pt called and would like a refill that the ER gave her for her infection on leg from a month ago? It is red and swelling?     cleoncin 150 mg take 3 capsules qid for 7 days. #84 last filled 5/9/17 spoke to darrel and he ok'd this part    I will call pt and tell her you are out until Monday if it is bad she needs to be see at ED or [facility]      Also pt has a growth on back that wants to see a  Surgeon  Dr moore on [facility]. Will you put in a referral for this as well.

## 2017-08-07 ENCOUNTER — LAB (OUTPATIENT)
Dept: OTHER | Facility: HOSPITAL | Age: 62
End: 2017-08-07

## 2017-08-07 DIAGNOSIS — D50.9 IRON DEFICIENCY ANEMIA, UNSPECIFIED IRON DEFICIENCY ANEMIA TYPE: ICD-10-CM

## 2017-08-07 LAB
BASOPHILS # BLD AUTO: 0.03 10*3/MM3 (ref 0–0.2)
BASOPHILS NFR BLD AUTO: 0.7 % (ref 0–1.5)
DEPRECATED RDW RBC AUTO: 48.6 FL (ref 37–54)
EOSINOPHIL # BLD AUTO: 0.31 10*3/MM3 (ref 0–0.7)
EOSINOPHIL NFR BLD AUTO: 7.2 % (ref 0.3–6.2)
ERYTHROCYTE [DISTWIDTH] IN BLOOD BY AUTOMATED COUNT: 15.9 % (ref 11.7–13)
FERRITIN SERPL-MCNC: 17.7 NG/ML (ref 13–150)
HCT VFR BLD AUTO: 36.6 % (ref 35.6–45.5)
HGB BLD-MCNC: 11.4 G/DL (ref 11.9–15.5)
HGB RETIC QN: 29.7 PG (ref 32.7–38.6)
IMM GRANULOCYTES # BLD: 0 10*3/MM3 (ref 0–0.03)
IMM GRANULOCYTES NFR BLD: 0 % (ref 0–0.5)
IMM RETICS NFR: 10 % (ref 0.7–13.7)
IRON 24H UR-MRATE: 37 MCG/DL (ref 37–145)
IRON SATN MFR SERPL: 13 % (ref 20–50)
LYMPHOCYTES # BLD AUTO: 1.38 10*3/MM3 (ref 0.9–4.8)
LYMPHOCYTES NFR BLD AUTO: 31.9 % (ref 19.6–45.3)
MCH RBC QN AUTO: 26.8 PG (ref 26.9–32)
MCHC RBC AUTO-ENTMCNC: 31.1 G/DL (ref 32.4–36.3)
MCV RBC AUTO: 85.9 FL (ref 80.5–98.2)
MONOCYTES # BLD AUTO: 0.46 10*3/MM3 (ref 0.2–1.2)
MONOCYTES NFR BLD AUTO: 10.6 % (ref 5–12)
NEUTROPHILS # BLD AUTO: 2.15 10*3/MM3 (ref 1.9–8.1)
NEUTROPHILS NFR BLD AUTO: 49.6 % (ref 42.7–76)
NRBC BLD MANUAL-RTO: 0 /100 WBC (ref 0–0)
PLATELET # BLD AUTO: 228 10*3/MM3 (ref 140–500)
PMV BLD AUTO: 9.9 FL (ref 6–12)
RBC # BLD AUTO: 4.26 10*6/MM3 (ref 3.9–5.2)
RETICS/RBC NFR AUTO: 1.01 % (ref 0.5–1.5)
TIBC SERPL-MCNC: 277 MCG/DL (ref 298–536)
TRANSFERRIN SERPL-MCNC: 186 MG/DL (ref 200–360)
VIT B12 BLD-MCNC: 269 PG/ML (ref 211–946)
WBC NRBC COR # BLD: 4.33 10*3/MM3 (ref 4.5–10.7)

## 2017-08-07 PROCEDURE — 82607 VITAMIN B-12: CPT | Performed by: INTERNAL MEDICINE

## 2017-08-07 PROCEDURE — 84466 ASSAY OF TRANSFERRIN: CPT | Performed by: INTERNAL MEDICINE

## 2017-08-07 PROCEDURE — 85025 COMPLETE CBC W/AUTO DIFF WBC: CPT | Performed by: INTERNAL MEDICINE

## 2017-08-07 PROCEDURE — 83540 ASSAY OF IRON: CPT | Performed by: INTERNAL MEDICINE

## 2017-08-07 PROCEDURE — 36415 COLL VENOUS BLD VENIPUNCTURE: CPT

## 2017-08-07 PROCEDURE — 82728 ASSAY OF FERRITIN: CPT | Performed by: INTERNAL MEDICINE

## 2017-08-07 PROCEDURE — 85046 RETICYTE/HGB CONCENTRATE: CPT | Performed by: INTERNAL MEDICINE

## 2017-08-09 ENCOUNTER — APPOINTMENT (OUTPATIENT)
Dept: OTHER | Facility: HOSPITAL | Age: 62
End: 2017-08-09

## 2017-08-09 ENCOUNTER — APPOINTMENT (OUTPATIENT)
Dept: ONCOLOGY | Facility: CLINIC | Age: 62
End: 2017-08-09

## 2017-08-09 RX ORDER — POTASSIUM CHLORIDE 600 MG/1
TABLET, FILM COATED, EXTENDED RELEASE ORAL
Qty: 20 TABLET | Refills: 1 | Status: SHIPPED | OUTPATIENT
Start: 2017-08-09 | End: 2017-09-10 | Stop reason: SDUPTHER

## 2017-09-01 ENCOUNTER — TELEPHONE (OUTPATIENT)
Dept: FAMILY MEDICINE CLINIC | Facility: CLINIC | Age: 62
End: 2017-09-01

## 2017-09-01 NOTE — TELEPHONE ENCOUNTER
Alysha said that her leg are swollen and she needs a note before her FMLA papers can get sent. She said that this needs to say she is under your care and that she will need 3 days a week off due to the leg swelling.

## 2017-09-01 NOTE — TELEPHONE ENCOUNTER
Pt informed and should call me back and let me know if this needs to be faxed, mailed, or is she going to pick the note up?

## 2017-09-11 RX ORDER — FUROSEMIDE 20 MG/1
TABLET ORAL
Qty: 20 TABLET | Refills: 1 | Status: SHIPPED | OUTPATIENT
Start: 2017-09-11 | End: 2017-11-28 | Stop reason: SDUPTHER

## 2017-09-11 RX ORDER — POTASSIUM CHLORIDE 600 MG/1
TABLET, FILM COATED, EXTENDED RELEASE ORAL
Qty: 20 TABLET | Refills: 0 | Status: SHIPPED | OUTPATIENT
Start: 2017-09-11 | End: 2017-11-06 | Stop reason: SDUPTHER

## 2017-09-11 RX ORDER — CLINDAMYCIN HYDROCHLORIDE 150 MG/1
CAPSULE ORAL
Qty: 84 CAPSULE | Refills: 0 | Status: SHIPPED | OUTPATIENT
Start: 2017-09-11 | End: 2017-10-16 | Stop reason: SDUPTHER

## 2017-09-18 DIAGNOSIS — G89.29 CHRONIC PAIN OF BOTH KNEES: Primary | ICD-10-CM

## 2017-09-18 DIAGNOSIS — M25.561 CHRONIC PAIN OF BOTH KNEES: Primary | ICD-10-CM

## 2017-09-18 DIAGNOSIS — M25.562 CHRONIC PAIN OF BOTH KNEES: Primary | ICD-10-CM

## 2017-09-20 ENCOUNTER — TELEPHONE (OUTPATIENT)
Dept: FAMILY MEDICINE CLINIC | Facility: CLINIC | Age: 62
End: 2017-09-20

## 2017-09-20 NOTE — TELEPHONE ENCOUNTER
Alysha wants to know if you can write her a script for her Percocet. She is set up for Karey's office but will run out before that appt.

## 2017-09-21 NOTE — TELEPHONE ENCOUNTER
I can write a one-month prescription but she'll have to come in to the office for this to sign the contract

## 2017-09-26 ENCOUNTER — OFFICE VISIT (OUTPATIENT)
Dept: PAIN MEDICINE | Facility: CLINIC | Age: 62
End: 2017-09-26

## 2017-09-26 ENCOUNTER — OFFICE VISIT (OUTPATIENT)
Dept: FAMILY MEDICINE CLINIC | Facility: CLINIC | Age: 62
End: 2017-09-26

## 2017-09-26 VITALS
WEIGHT: 194 LBS | SYSTOLIC BLOOD PRESSURE: 110 MMHG | TEMPERATURE: 98 F | BODY MASS INDEX: 35.7 KG/M2 | HEIGHT: 62 IN | RESPIRATION RATE: 16 BRPM | OXYGEN SATURATION: 98 % | DIASTOLIC BLOOD PRESSURE: 64 MMHG | HEART RATE: 68 BPM

## 2017-09-26 VITALS
DIASTOLIC BLOOD PRESSURE: 69 MMHG | HEART RATE: 64 BPM | WEIGHT: 195 LBS | SYSTOLIC BLOOD PRESSURE: 112 MMHG | TEMPERATURE: 97.9 F | BODY MASS INDEX: 35.88 KG/M2 | HEIGHT: 62 IN

## 2017-09-26 DIAGNOSIS — M25.562 CHRONIC PAIN OF BOTH KNEES: ICD-10-CM

## 2017-09-26 DIAGNOSIS — G89.29 CHRONIC PAIN OF BOTH KNEES: Primary | ICD-10-CM

## 2017-09-26 DIAGNOSIS — M25.562 CHRONIC PAIN OF BOTH KNEES: Primary | ICD-10-CM

## 2017-09-26 DIAGNOSIS — G89.29 CHRONIC PAIN OF BOTH KNEES: ICD-10-CM

## 2017-09-26 DIAGNOSIS — M25.561 CHRONIC PAIN OF BOTH KNEES: Primary | ICD-10-CM

## 2017-09-26 DIAGNOSIS — G89.29 OTHER CHRONIC PAIN: Primary | ICD-10-CM

## 2017-09-26 DIAGNOSIS — M25.561 CHRONIC PAIN OF BOTH KNEES: ICD-10-CM

## 2017-09-26 LAB
POC AMPHETAMINES: NEGATIVE
POC BARBITURATES: NEGATIVE
POC BENZODIAZEPHINES: NEGATIVE
POC COCAINE: NEGATIVE
POC METHADONE: NEGATIVE
POC METHAMPHETAMINE SCREEN URINE: NEGATIVE
POC OPIATES: POSITIVE
POC OXYCODONE: POSITIVE
POC PHENCYCLIDINE: NEGATIVE
POC PROPOXYPHENE: NEGATIVE
POC THC: NEGATIVE
POC TRICYCLIC ANTIDEPRESSANTS: NEGATIVE

## 2017-09-26 PROCEDURE — 99203 OFFICE O/P NEW LOW 30 MIN: CPT | Performed by: NURSE PRACTITIONER

## 2017-09-26 PROCEDURE — 99213 OFFICE O/P EST LOW 20 MIN: CPT | Performed by: INTERNAL MEDICINE

## 2017-09-26 PROCEDURE — 80305 DRUG TEST PRSMV DIR OPT OBS: CPT | Performed by: NURSE PRACTITIONER

## 2017-09-26 RX ORDER — OXYCODONE AND ACETAMINOPHEN 7.5; 325 MG/1; MG/1
1 TABLET ORAL EVERY 8 HOURS PRN
Qty: 90 TABLET | Refills: 0 | Status: SHIPPED | OUTPATIENT
Start: 2017-09-26

## 2017-09-26 NOTE — PROGRESS NOTES
CHIEF COMPLAINT  Pt here for bilateral knee pain since about 10 years ago.Pt has had an arthroscopic sx of R knee per Dr Gabriel about 6 years ago,preceded by several cortisone injections with about 6 months of moderate relief initially,but no relief with more recent injections (about 6 years ago).  Pt has had no injections of L knee and no sx of L knee.  Pt also had been treated with MS contin and Percocet per Dr Javy Rae,but Dr Rae has moved out of town .   Pt states she has lost her 9/17 script of MS Contin and Percocet reportedly dated for refill on 9/25/17.    Subjective   Alysha Sullivan is a 62 y.o. female.   She presents to the office for evaluation of bilat. knee pain. She was referred here by Dr Stark.     Patient has Stark only 10 year history of bilateral knee pain.  She has history of arthroscopic surgery of the right knee and proximal 7-8 years ago, surgeon was Dr. Gabriel.  She completed a number of right knee injections both before and after surgery, the injections initially provided some moderate relief but ultimately stopped helping.  She also went through water therapy and PT without much benefit.  She has been in pain management with Dr. Javy Rae for the past 5-6 years and has been prescribed high dose opioids for management of the knee pain.  She last saw Dr. Rae 5/30/2017     She has most recently been evaluated by Dr. Chua for orthopedic surgery, last visit would have been just over a year ago.  At that time he was recommending surgery right total knee replacement, however she has a large mass in the area which he states she must have removed by plastic surgeon (Dr. Vaughan) prior to knee surgery or she will not recover full range of motion. Insurance has denied the plastic surgery, working on appeal process.      Has been seeing Dr. Javy Rae for the previous 5-6 years. Has been managed with medication.  MS Contin 60 mg BID and Oxycodone 10 mg QID.  She has  been on this regimen for about the past year.  She reports that she is able to work and do her job and that the pain is tolerable.  She also reports that Dr. Rae had written her refills for both medications which she has misplaced, she is almost completely out of pain medication.      She reports that she saw an NP at [facility] two weeks ago, they would not offer to prescribe her pain medication at the current doses, she would have to go below 100 mg of Morphine equivalents daily.      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  She does not use tobacco or illicit substances.  Does admit to occasional alcohol use.      Taking Clindamycin for cellulitis infection of the LE's.     History of Present Illness     PEG Assessment   What number best describes your pain on average in the past week?5  What number best describes how, during the past week, pain has interfered with your enjoyment of life?6  What number best describes how, during the past week, pain has interfered with your general activity?  6      Current Outpatient Prescriptions:   •  clindamycin (CLEOCIN) 150 MG capsule, TAKE THREE CAPSULES BY MOUTH FOUR TIMES A DAY, Disp: 84 capsule, Rfl: 0  •  furosemide (LASIX) 20 MG tablet, TAKE ONE TABLET BY MOUTH TWICE A DAY, Disp: 20 tablet, Rfl: 1  •  potassium chloride (KLOR-CON) 8 MEQ CR tablet, TAKE ONE TABLET BY MOUTH DAILY, Disp: 20 tablet, Rfl: 0  •  sennosides-docusate sodium (SENOKOT-S) 8.6-50 MG tablet, Take 1 tablet by mouth Daily., Disp: , Rfl:   •  morphine (MS CONTIN) 30 MG 12 hr tablet, Take 60 mg by mouth Every 12 (Twelve) Hours., Disp: , Rfl:   •  oxyCODONE-acetaminophen (PERCOCET) 7.5-325 MG per tablet, Take 1 tablet by mouth Every 8 (Eight) Hours As Needed for Severe Pain ., Disp: 90 tablet, Rfl: 0    The following portions of the patient's history were reviewed and updated as appropriate:  "allergies, current medications, past family history, past medical history, past social history, past surgical history and problem list.      REVIEW OF PERTINENT MEDICAL DATA    New patient referral was received from Dr. Kunal Stark for chronic pain of bilateral knees.    MELISSA ROD  Not Completed    I reviewed the office visit note from 7/20/2017 Dr. Kunal Stark  Patient has long-standing history of chronic knee pain.  Pain management doctor is leaving town and needs a referral to new pain management.  We'll refer to Dr. Eden.    Review of Systems   Constitutional: Positive for activity change (decreased) and fatigue. Negative for appetite change.   HENT: Negative for hearing loss.    Eyes: Negative for visual disturbance.   Respiratory: Negative for apnea, chest tightness and shortness of breath.    Cardiovascular: Negative for chest pain.   Gastrointestinal: Negative for constipation, diarrhea and nausea.   Genitourinary: Negative for difficulty urinating and dysuria.   Musculoskeletal: Positive for back pain, gait problem and neck pain.   Neurological: Positive for weakness (bilat. legs). Negative for dizziness, seizures, light-headedness and numbness.   Psychiatric/Behavioral: Positive for sleep disturbance. Negative for suicidal ideas.     Vitals:    09/26/17 1310   BP: 112/69   Pulse: 64   Temp: 97.9 °F (36.6 °C)   Weight: 195 lb (88.5 kg)   Height: 62\" (157.5 cm)   PainSc: 6  Comment: Bilateral knee pain ranges from 4-8/10   PainLoc: Knee     Objective   Physical Exam   Constitutional: She is oriented to person, place, and time. She appears well-developed and well-nourished. She is cooperative. No distress.   HENT:   Head: Normocephalic and atraumatic.   Right Ear: External ear normal.   Left Ear: External ear normal.   Nose: Nose normal.   Eyes: Conjunctivae and lids are normal. Pupils are equal, round, and reactive to light.   Neck: Trachea normal and normal range of motion. Neck supple. "   Cardiovascular: Normal rate, regular rhythm and normal heart sounds.    Pulmonary/Chest: Effort normal and breath sounds normal. No respiratory distress.   Abdominal: Soft. Normal appearance.   obese   Musculoskeletal: She exhibits no deformity.        Right knee: She exhibits decreased range of motion and swelling. She exhibits no effusion, no ecchymosis, no deformity, no laceration and no erythema. Tenderness found.        Left knee: She exhibits decreased range of motion and swelling. She exhibits no effusion, no ecchymosis, no deformity, no laceration and no erythema. Tenderness found.   Neurological: She is alert and oriented to person, place, and time. She has normal strength and normal reflexes. No cranial nerve deficit or sensory deficit. Gait (antalgia, ambulates with cane) abnormal. Coordination normal.   Reflex Scores:       Patellar reflexes are 2+ on the right side and 2+ on the left side.       Achilles reflexes are 2+ on the right side and 2+ on the left side.  Skin: Skin is warm, dry and intact. She is not diaphoretic.   Psychiatric: She has a normal mood and affect. Her speech is normal and behavior is normal. Judgment and thought content normal. Cognition and memory are normal.   Nursing note and vitals reviewed.      Assessment/Plan   Alysha was seen today for knee pain.    Diagnoses and all orders for this visit:    Other chronic pain  -     Urine Drug Screen Confirmation  -     POC Urine Drug Screen, Triage    Chronic pain of both knees  -     Urine Drug Screen Confirmation  -     POC Urine Drug Screen, Triage      --- The patient is currently taking very high doses of opioids which we will not be taking over.  She would have to come down significantly for us to consider opioid therapy, and even then she would be required to proceed with our standard evaluation process prior to this consideration (including substance abuse evaluation and confirmation UDS).  The patient does not wish to reduce  "the doses of her medication, she states that she \"cannot function\" at lower doses.    --- We did discuss the interventional nature of this practice and discussed in detail genicular nerve blocks which she would be a candidate for.  Patient does NOT wish to proceed at this time.   -----  Education about Genicular Nerve Blocks & RF at the Knee:    We engaged in an education session about the theory of genicular nerve blockade at the knee and also how radiofrequency therapy may help to provide sustained relief from chronic knee pain.      In patients with long term knee pain and/or chronic degenerative joint disease, who either fail or are not good candidates for long term medication management of knee pain, and who may have failed with intraarticular corticosteroid and/or hyaluronic acid injections in the knee, and who either may not be good orthopedic surgical candidates or may be wanting to avoid knee replacement, or have had knee replacements but continue to have severe pain, the consideration for Genicular nerve RF therapy may be a reasonable option to try to provide longer term pain relief.       The potential efficacy of the radiofrequency therapy is considered with genicular nerve blockades as a diagnostic therapy.  There is prognostic indication that patients who have diagnostic positive blockades of the nerves may respond well to radiofrequency therapy of the same nerves.      The genicular nerves are sensory nerve branches of major nerves to the lower extremity, especially the tibial and the common peroneal nerves.  The medial branches are primarily supplied by the tibial nerve, and the lateral branches primarily by the common peroneal nerve.  These four genicular nerve branches in essence form a square of innervation around the knee.  The inferolateral branch is not easily accessible because of the fibula.  Therefore, the three branches targeted are the superolateral, superomedial, and inferomedial.  "     Under Xray guidance, needles are placed to these three branches' approximate locations and the branches are numbed with local anesthetic.  The question is whether blocking these branches is able to block the patient's perception of pain to the area, thus making the procedure a diagnostic blockade.   Usually the blockade is repeated to confirm reproducible diagnostic results.  Most often the blockades are performed under monitored anesthesia care, to aid in the patient being able awaken quickly from sedation and be able to engage in activities, therefore making this process an active diagnostic procedure.      If diagnostically positive (oftentimes x2), then Radiofrequency lesioning of the branches may be considered.  One or two needles are placed to the location of each nerve branch, and the needles are heated to thermally treat the nerve branches by injuring the branches, thereby inhibiting their ability to transmit painful sensory signals.  This therapy is usually performed under monitored anesthesia care as well.  The procedure could be repeated every 6-12 months as needed.  Risks of the procedure include but are not limited to bleeding, infection, injury, worsening of clinical picture, and nerve injury.    ------  --- Would need to wait for any infection/cellulits to clear before proceeding with any intervention in the knee area  --- Would also request records from Dr. Chua   --- Routine UDS in office today as part of new patient evaluation.  The specimen was viewed and the immunoassay result reviewed and is +OPI, OXY.  This specimen will be sent to [facility] laboratory for confirmation.     --- Follow-up as needed          MELISSA REPORT  MELISSA report has been reviewed and scanned into the patient's chart.    Date of last MELISSA : 9/25/2017

## 2017-09-26 NOTE — PROGRESS NOTES
"Subjective   Patient ID: Alysha Sullivan is a 62 y.o. female presents with   Chief Complaint   Patient presents with   • Med Refill     on pain meds       HPI - This patient has a history of end-stage arthritis of bilateral knees.  She's been seeing pain management for years and has been on high doses of narcotics.  She was recently notified that her pain management DrMaribel was going out of business.  She tried to get established with a new pain management physician and they refused to take her case.  She is about out of narcotics and I'm worried that she's going to go into withdrawal.  I told her would not be able to keep her on large doses of narcotics but we will give her some to reverse her tolerance and to wean her down.  She says her pain is severe in intensity and she stated these high doses to help her be controlled.  She's been refused knee replacements because of her severe obesity.    Assessment plan    Chronic pain in bilateral knees and narcotic dependence-refused to refill her MS Contin and will decrease her oxycodone from 10-7.5 and decrease the number down from 125-90.  She says she is going to try to get into another pain management doctor.    Allergies   Allergen Reactions   • Ibuprofen    • Sulfa Antibiotics        The following portions of the patient's history were reviewed and updated as appropriate: allergies, current medications, past family history, past medical history, past social history, past surgical history and problem list.      Review of Systems   Constitutional: Negative.    Musculoskeletal: Positive for arthralgias and gait problem.   Psychiatric/Behavioral: Negative.        Objective     Vitals:    09/26/17 1438   BP: 110/64   Pulse: 68   Resp: 16   Temp: 98 °F (36.7 °C)   TempSrc: Oral   SpO2: 98%   Weight: 194 lb (88 kg)   Height: 62\" (157.5 cm)         Physical Exam   Constitutional: She appears well-developed and well-nourished.   Musculoskeletal: She exhibits edema and " tenderness.   Psychiatric: She has a normal mood and affect. Her behavior is normal.   Nursing note and vitals reviewed.        Alysha was seen today for med refill.    Diagnoses and all orders for this visit:    Chronic pain of both knees    Other orders  -     oxyCODONE-acetaminophen (PERCOCET) 7.5-325 MG per tablet; Take 1 tablet by mouth Every 8 (Eight) Hours As Needed for Severe Pain .        Call or return to clinic prn if these symptoms worsen or fail to improve as anticipated.

## 2017-09-26 NOTE — PATIENT INSTRUCTIONS
-----  Education about Genicular Nerve Blocks & RF at the Knee:    We engaged in an education session about the theory of genicular nerve blockade at the knee and also how radiofrequency therapy may help to provide sustained relief from chronic knee pain.      In patients with long term knee pain and/or chronic degenerative joint disease, who either fail or are not good candidates for long term medication management of knee pain, and who may have failed with intraarticular corticosteroid and/or hyaluronic acid injections in the knee, and who either may not be good orthopedic surgical candidates or may be wanting to avoid knee replacement, or have had knee replacements but continue to have severe pain, the consideration for Genicular nerve RF therapy may be a reasonable option to try to provide longer term pain relief.       The potential efficacy of the radiofrequency therapy is considered with genicular nerve blockades as a diagnostic therapy.  There is prognostic indication that patients who have diagnostic positive blockades of the nerves may respond well to radiofrequency therapy of the same nerves.      The genicular nerves are sensory nerve branches of major nerves to the lower extremity, especially the tibial and the common peroneal nerves.  The medial branches are primarily supplied by the tibial nerve, and the lateral branches primarily by the common peroneal nerve.  These four genicular nerve branches in essence form a square of innervation around the knee.  The inferolateral branch is not easily accessible because of the fibula.  Therefore, the three branches targeted are the superolateral, superomedial, and inferomedial.      Under Xray guidance, needles are placed to these three branches' approximate locations and the branches are numbed with local anesthetic.  The question is whether blocking these branches is able to block the patient's perception of pain to the area, thus making the procedure a  diagnostic blockade.   Usually the blockade is repeated to confirm reproducible diagnostic results.  Most often the blockades are performed under monitored anesthesia care, to aid in the patient being able awaken quickly from sedation and be able to engage in activities, therefore making this process an active diagnostic procedure.      If diagnostically positive (oftentimes x2), then Radiofrequency lesioning of the branches may be considered.  One or two needles are placed to the location of each nerve branch, and the needles are heated to thermally treat the nerve branches by injuring the branches, thereby inhibiting their ability to transmit painful sensory signals.  This therapy is usually performed under monitored anesthesia care as well.  The procedure could be repeated every 6-12 months as needed.  Risks of the procedure include but are not limited to bleeding, infection, injury, worsening of clinical picture, and nerve injury.    ------

## 2017-10-11 DIAGNOSIS — M25.562 BILATERAL CHRONIC KNEE PAIN: Primary | ICD-10-CM

## 2017-10-11 DIAGNOSIS — G89.29 BILATERAL CHRONIC KNEE PAIN: Primary | ICD-10-CM

## 2017-10-11 DIAGNOSIS — M25.561 BILATERAL CHRONIC KNEE PAIN: Primary | ICD-10-CM

## 2017-10-17 RX ORDER — CLINDAMYCIN HYDROCHLORIDE 300 MG/1
300 CAPSULE ORAL 3 TIMES DAILY
Qty: 21 CAPSULE | Refills: 0 | Status: SHIPPED | OUTPATIENT
Start: 2017-10-17 | End: 2017-11-06 | Stop reason: SDUPTHER

## 2017-11-07 RX ORDER — CLINDAMYCIN HYDROCHLORIDE 300 MG/1
CAPSULE ORAL
Qty: 21 CAPSULE | Refills: 0 | Status: SHIPPED | OUTPATIENT
Start: 2017-11-07 | End: 2018-04-18 | Stop reason: SDUPTHER

## 2017-11-07 RX ORDER — POTASSIUM CHLORIDE 600 MG/1
TABLET, FILM COATED, EXTENDED RELEASE ORAL
Qty: 20 TABLET | Refills: 0 | Status: SHIPPED | OUTPATIENT
Start: 2017-11-07 | End: 2018-02-21 | Stop reason: SDUPTHER

## 2017-11-10 ENCOUNTER — TELEPHONE (OUTPATIENT)
Dept: FAMILY MEDICINE CLINIC | Facility: CLINIC | Age: 62
End: 2017-11-10

## 2017-11-10 NOTE — TELEPHONE ENCOUNTER
[place] called patient and spoke to eufemia about getting her in a detox program to get off of the high risk medications.

## 2017-11-14 ENCOUNTER — TELEPHONE (OUTPATIENT)
Dept: FAMILY MEDICINE CLINIC | Facility: CLINIC | Age: 62
End: 2017-11-14

## 2017-11-14 NOTE — TELEPHONE ENCOUNTER
"Spoke to \"[facility]\" at [address] @ [phone].    For Alysha to see them about Opiate Detox. The intake nurse said that I can make a appointment for her and they would evaluate her at that time and can not guarantee that she would be a inpatient.     I made Alysha a appointment today at 8:30 tonight on 11/14/2017. She is suppose to bring her insurance card and her ID. They said if she can not make the appt or needs to reschedule than she will need to call them.    Miladis at [facility] just called back and said they would not have a bed tonight just incase she was admitted.    I spoke to Miladis and have rescheduled Alysha for 11 am.  Call prior to appointment to make sure a bed is going to be avaliable. Call intake at [phone]   "

## 2017-11-15 NOTE — TELEPHONE ENCOUNTER
Please have the patient come in sometime this week so that we can discuss her potential options concerning her chronic pain medicine

## 2017-11-16 ENCOUNTER — TELEPHONE (OUTPATIENT)
Dept: FAMILY MEDICINE CLINIC | Facility: CLINIC | Age: 62
End: 2017-11-16

## 2017-11-16 NOTE — TELEPHONE ENCOUNTER
Patient needs to come in to discuss options about pain medicines and I'll be glad to provide a note for her

## 2017-11-17 ENCOUNTER — TELEPHONE (OUTPATIENT)
Dept: FAMILY MEDICINE CLINIC | Facility: CLINIC | Age: 62
End: 2017-11-17

## 2017-11-17 NOTE — TELEPHONE ENCOUNTER
Left a message on eufemia's voicemail that she needs to schedule a appt soon maybe even today or the beginning of next week to discuss her pain management options

## 2017-11-20 ENCOUNTER — OFFICE VISIT (OUTPATIENT)
Dept: FAMILY MEDICINE CLINIC | Facility: CLINIC | Age: 62
End: 2017-11-20

## 2017-11-20 VITALS
WEIGHT: 202 LBS | TEMPERATURE: 98.4 F | HEART RATE: 73 BPM | OXYGEN SATURATION: 92 % | DIASTOLIC BLOOD PRESSURE: 72 MMHG | RESPIRATION RATE: 18 BRPM | SYSTOLIC BLOOD PRESSURE: 134 MMHG | BODY MASS INDEX: 37.17 KG/M2 | HEIGHT: 62 IN

## 2017-11-20 DIAGNOSIS — L98.7 EXCESS SKIN: ICD-10-CM

## 2017-11-20 DIAGNOSIS — M25.562 CHRONIC PAIN OF BOTH KNEES: Primary | ICD-10-CM

## 2017-11-20 DIAGNOSIS — M25.561 CHRONIC PAIN OF BOTH KNEES: Primary | ICD-10-CM

## 2017-11-20 DIAGNOSIS — G89.29 CHRONIC PAIN OF BOTH KNEES: Primary | ICD-10-CM

## 2017-11-20 PROCEDURE — 99213 OFFICE O/P EST LOW 20 MIN: CPT | Performed by: INTERNAL MEDICINE

## 2017-11-20 NOTE — PROGRESS NOTES
"Subjective   Patient ID: Alysha Sullivan is a 62 y.o. female presents with   Chief Complaint   Patient presents with   • pain medication options       HPI - This patient has chronic knee pain from osteoarthritis.  She can get the orthopedist to operate on her because she has a large amount of excess skin from prior obesity.  She has been in pain management for the last 5 or 6 years and they've had her on MS Contin and percocet.  Her latest pain medicine doctor decreased her MS Contin down to 30 mg every 12.  She's also on Percocet.  She was wondering if I can get her in with someone who would take her back up to the original 60 mg.  I told her that that seems like too much to me.  I recommend she stay with her current pain management physician and try to wean down further.  The patient wants me to send her back to the plastic surgeon Dr. Vaughan to see if they can work on her leg to remove excess skin and tissue so that the orthopedist can work on her arthritic knee.  I will put in a referral.    Allergies   Allergen Reactions   • Ibuprofen    • Sulfa Antibiotics        The following portions of the patient's history were reviewed and updated as appropriate: allergies, current medications, past family history, past medical history, past social history, past surgical history and problem list.      Review of Systems   Constitutional: Positive for fatigue. Negative for fever.   Musculoskeletal: Positive for arthralgias and gait problem.   Psychiatric/Behavioral: Negative.        Objective     Vitals:    11/20/17 1253   BP: 134/72   Pulse: 73   Resp: 18   Temp: 98.4 °F (36.9 °C)   TempSrc: Oral   SpO2: 92%   Weight: 202 lb (91.6 kg)   Height: 62\" (157.5 cm)         Physical Exam   Constitutional: She is oriented to person, place, and time. She appears well-developed and well-nourished.   Neurological: She is alert and oriented to person, place, and time.   Psychiatric: She has a normal mood and affect. Her " behavior is normal.   Nursing note and vitals reviewed.        Alysha was seen today for pain medication options.    Diagnoses and all orders for this visit:    Chronic pain of both knees    Excess skin  -     Ambulatory Referral to Plastic Surgery        Call or return to clinic prn if these symptoms worsen or fail to improve as anticipated.

## 2017-11-28 RX ORDER — FUROSEMIDE 20 MG/1
TABLET ORAL
Qty: 20 TABLET | Refills: 0 | Status: SHIPPED | OUTPATIENT
Start: 2017-11-28 | End: 2018-04-18 | Stop reason: SDUPTHER

## 2017-11-28 RX ORDER — CLINDAMYCIN HYDROCHLORIDE 150 MG/1
CAPSULE ORAL
Qty: 84 CAPSULE | Refills: 0 | Status: SHIPPED | OUTPATIENT
Start: 2017-11-28 | End: 2017-12-28 | Stop reason: SDUPTHER

## 2017-12-29 RX ORDER — CLINDAMYCIN HYDROCHLORIDE 150 MG/1
CAPSULE ORAL
Qty: 84 CAPSULE | Refills: 0 | Status: SHIPPED | OUTPATIENT
Start: 2017-12-29 | End: 2018-03-11 | Stop reason: SDUPTHER

## 2018-01-29 ENCOUNTER — TELEPHONE (OUTPATIENT)
Dept: FAMILY MEDICINE CLINIC | Facility: CLINIC | Age: 63
End: 2018-01-29

## 2018-02-21 RX ORDER — POTASSIUM CHLORIDE 600 MG/1
TABLET, FILM COATED, EXTENDED RELEASE ORAL
Qty: 20 TABLET | Refills: 0 | Status: SHIPPED | OUTPATIENT
Start: 2018-02-21 | End: 2018-04-18 | Stop reason: SDUPTHER

## 2018-03-12 RX ORDER — CLINDAMYCIN HYDROCHLORIDE 150 MG/1
CAPSULE ORAL
Qty: 84 CAPSULE | Refills: 0 | Status: SHIPPED | OUTPATIENT
Start: 2018-03-12 | End: 2018-04-18 | Stop reason: SDUPTHER

## 2018-04-06 ENCOUNTER — TELEPHONE (OUTPATIENT)
Dept: FAMILY MEDICINE CLINIC | Facility: CLINIC | Age: 63
End: 2018-04-06

## 2018-04-06 NOTE — TELEPHONE ENCOUNTER
Pt is wanting FMLA paper filled out.   Per dr rojo she needs an appt    I called and left a message for her to call the  and scheduled an appt with him

## 2018-04-18 ENCOUNTER — OFFICE VISIT (OUTPATIENT)
Dept: FAMILY MEDICINE CLINIC | Facility: CLINIC | Age: 63
End: 2018-04-18

## 2018-04-18 VITALS
HEIGHT: 62 IN | WEIGHT: 210.3 LBS | HEART RATE: 67 BPM | BODY MASS INDEX: 38.7 KG/M2 | DIASTOLIC BLOOD PRESSURE: 64 MMHG | TEMPERATURE: 98.9 F | SYSTOLIC BLOOD PRESSURE: 108 MMHG | OXYGEN SATURATION: 97 %

## 2018-04-18 DIAGNOSIS — R60.0 LOCALIZED EDEMA: Primary | ICD-10-CM

## 2018-04-18 PROCEDURE — 99213 OFFICE O/P EST LOW 20 MIN: CPT | Performed by: INTERNAL MEDICINE

## 2018-04-18 RX ORDER — OMEPRAZOLE 20 MG/1
CAPSULE, DELAYED RELEASE ORAL
COMMUNITY
Start: 2018-02-19

## 2018-04-18 RX ORDER — CLINDAMYCIN HYDROCHLORIDE 300 MG/1
300 CAPSULE ORAL 3 TIMES DAILY
Qty: 21 CAPSULE | Refills: 0 | Status: SHIPPED | OUTPATIENT
Start: 2018-04-18

## 2018-04-18 RX ORDER — FUROSEMIDE 20 MG/1
20 TABLET ORAL 2 TIMES DAILY
Qty: 60 TABLET | Refills: 1 | Status: SHIPPED | OUTPATIENT
Start: 2018-04-18

## 2018-04-18 RX ORDER — POTASSIUM CHLORIDE 600 MG/1
8 TABLET, FILM COATED, EXTENDED RELEASE ORAL DAILY
Qty: 30 TABLET | Refills: 1 | Status: SHIPPED | OUTPATIENT
Start: 2018-04-18

## 2018-04-18 NOTE — PROGRESS NOTES
"Subjective   Patient ID: Alysha Sullivan is a 63 y.o. female presents with   Chief Complaint   Patient presents with   • Leg Swelling     bilateral for about 6 months off and on        HPI - This patient presents today with acute on chronic lower extremity edema.  She has no shortness of breath or known congestive heart failure.  She's not been taking her diuretics routinely and I suspect that she's off her diet she is taking nonsteroidals and she goes to pain clinic as well.    Assessment plan    Lower extremity edema-refilled her Lasix to take 20 mg twice daily and potassium supplementation discontinue naproxen we'll get some routine labs.  She needs to return establish care with a new provider as I'm leaving the office she said she will.    Allergies   Allergen Reactions   • Ibuprofen    • Sulfa Antibiotics        The following portions of the patient's history were reviewed and updated as appropriate: allergies, current medications, past family history, past medical history, past social history, past surgical history and problem list.      Review of Systems   Constitutional: Negative.    Respiratory: Negative.    Cardiovascular: Positive for leg swelling.       Objective     Vitals:    04/18/18 1042   BP: 108/64   BP Location: Left arm   Patient Position: Sitting   Cuff Size: Large Adult   Pulse: 67   Temp: 98.9 °F (37.2 °C)   TempSrc: Oral   SpO2: 97%   Weight: 95.4 kg (210 lb 4.8 oz)   Height: 157.5 cm (62.01\")         Physical Exam   Constitutional: She is oriented to person, place, and time. She appears well-developed and well-nourished.   Musculoskeletal: She exhibits edema.   Neurological: She is alert and oriented to person, place, and time.   Nursing note and vitals reviewed.        Alysha was seen today for leg swelling.    Diagnoses and all orders for this visit:    Localized edema  -     Comprehensive Metabolic Panel  -     CBC & Differential    Other orders  -     furosemide (LASIX) 20 MG tablet; " Take 1 tablet by mouth 2 (Two) Times a Day.  -     potassium chloride (KLOR-CON) 8 MEQ CR tablet; Take 1 tablet by mouth Daily.  -     clindamycin (CLEOCIN) 300 MG capsule; Take 1 capsule by mouth 3 (Three) Times a Day.        Call or return to clinic prn if these symptoms worsen or fail to improve as anticipated.

## 2018-08-24 ENCOUNTER — TELEPHONE (OUTPATIENT)
Dept: FAMILY MEDICINE CLINIC | Facility: CLINIC | Age: 63
End: 2018-08-24

## 2018-10-03 ENCOUNTER — TRANSCRIBE ORDERS (OUTPATIENT)
Dept: PHYSICAL THERAPY | Facility: HOSPITAL | Age: 63
End: 2018-10-03

## 2018-10-03 DIAGNOSIS — I89.0 LYMPHEDEMA: Primary | ICD-10-CM

## 2018-10-18 ENCOUNTER — HOSPITAL ENCOUNTER (OUTPATIENT)
Dept: GENERAL RADIOLOGY | Facility: HOSPITAL | Age: 63
Discharge: HOME | End: 2018-10-18
Admitting: PHYSICIAN ASSISTANT

## 2018-10-18 ENCOUNTER — HOSPITAL ENCOUNTER (OUTPATIENT)
Dept: OCCUPATIONAL THERAPY | Facility: HOSPITAL | Age: 63
Setting detail: THERAPIES SERIES
Discharge: HOME | End: 2018-10-18

## 2018-10-18 DIAGNOSIS — Z11.1 SCREENING-PULMONARY TB: ICD-10-CM

## 2018-10-18 PROCEDURE — 71046 X-RAY EXAM CHEST 2 VIEWS: CPT

## 2018-11-15 ENCOUNTER — HOSPITAL ENCOUNTER (OUTPATIENT)
Dept: OCCUPATIONAL THERAPY | Facility: HOSPITAL | Age: 63
Setting detail: THERAPIES SERIES
Discharge: HOME | End: 2018-11-15

## 2018-11-15 DIAGNOSIS — I89.0 LYMPHEDEMA OF BOTH LOWER EXTREMITIES: Primary | ICD-10-CM

## 2018-11-15 PROCEDURE — 97165 OT EVAL LOW COMPLEX 30 MIN: CPT

## 2018-11-15 PROCEDURE — 97535 SELF CARE MNGMENT TRAINING: CPT

## 2018-11-15 NOTE — THERAPY EVALUATION
Outpatient Occupational Therapy Lymphedema Initial Evaluation  [facility]     Patient Name: Alysha Sullivan  : 1955  MRN: [id number]  Today's Date: 11/15/2018      Visit Date: 11/15/2018    Patient Active Problem List   Diagnosis   • Acute URI   • Atopic rhinitis   • Anemia   • Anxiety   • Edema   • Gastroesophageal reflux disease   • Fatigue   • Vitamin D deficiency   • Migraine   • Screening-pulmonary TB   • Iron deficiency anemia   • Other and unspecified postsurgical nonabsorption   • Epidermoid cyst   • Acute cystitis without hematuria   • Frequent UTI   • Cellulitis of right lower extremity   • Abnormal CT of the chest   • Chronic pain of both knees   • Excess skin        Past Medical History:   Diagnosis Date   • Anemia     Iron deficiency; B12 deficiency   • History of morbid obesity    • Leukocytopenia    • Osteoarthritis         Past Surgical History:   Procedure Laterality Date   • DILATATION AND CURETTAGE  2009   • GASTRIC BYPASS           Visit Dx:     ICD-10-CM ICD-9-CM   1. Lymphedema of both lower extremities I89.0 457.1       Patient History     Row Name 11/15/18 1600             History    Chief Complaint  Swelling  -RE      Date Current Problem(s) Began  -- long hx but worse in last 6 months  -RE      Brief Description of Current Complaint  Patient presents with a long history of LE swelling which she reports waxed and waned but in the last 6 months has increased significantly. She reports that she needs knee replacment surgery but must get her swelling under control first.    -RE      Patient/Caregiver Goals  Decrease swelling;Know what to do to help the symptoms  -RE      Are you or can you be pregnant  No  -RE         Pain     Pain Location  Knee  -RE      Pain at Present  7  -RE      Pain at Best  7  -RE      Pain at Worst  8  -RE      Is your sleep disturbed?  Yes  -RE         Services    Are you currently receiving Home Health services  No  -RE         Daily Activities     Primary Language  English  -RE      Are you able to read  Yes  -RE      Are you able to write  Yes  -RE      How does patient learn best?  Listening;Reading  -RE      Teaching needs identified  Home Exercise Program;Management of Condition  -RE      Patient is concerned about/has problems with  Performing home management (household chores, shopping, care of dependents);Performing job responsibilities/community activities (work, school,;Difficulty with self care (i.e. bathing, dressing, toileting:  -RE      Does patient have problems with the following?  None  -RE      Barriers to learning  None  -RE      Pt Participated in POC and Goals  Yes  -RE         Safety    Are you being hurt, hit, or frightened by anyone at home or in your life?  No  -RE      Are you being neglected by a caregiver  No  -RE        User Key  (r) = Recorded By, (t) = Taken By, (c) = Cosigned By    Initials Name Provider Type    Randa Momin OTR Occupational Therapist          Lymphedema     Row Name 11/15/18 1600             Subjective Pain    Able to rate subjective pain?  yes  -RE      Pre-Treatment Pain Level  7  -RE      Post-Treatment Pain Level  7  -RE         Subjective Comments    Subjective Comments  Reports 140 lb weight loss. She also reports her legs have improved lately since she has off work due to a death in the family.  -RE         Lymphedema Assessment    Lymphedema Classification  RLE:;LLE:;secondary;stage 2 (Spontaneously Irreversible)  -RE      Infections or Cellulitis?  yes  -RE      Lymphedema Assessment Comments  LE strenght and AROM are impaired due to arthritis, back pain and the weight of the extremities.   -RE         Lymphedema Edema Assessment    Ptting Edema Category  By grade out of 4  -RE      Pitting Edema  + 3/4;Moderate;Very Severe  -RE      Stemmer Sign  right:;negative;left:;positive  -RE      [place] Hump  bilateral:;negative  -RE         Skin Changes/Observations    Skin Observations Comment  Skin  is intact with mild hyperpigmentation.   -RE         Lymphedema Sensation    Lymphedema Sensation Comments  No c/o of numbness or tingling  -RE         Lymphedema Pulses/Capillary Refill    Lymphedema Pulses/Capillary Refill  capillary refill  -RE      Capillary Refill  lower extremity capillary refill  -RE      Lower Extremity Capillary Refill  right:;left:;less than 3 seconds  -RE         Lymphedema Measurements    Measurement Type(s)  Circumferential  -RE      Circumferential Areas  Lower extremities  -RE         BLE Circumferential (cm)    Measurement Location 1  20 cm above knee  -RE      Left 1  64 cm  -RE      Right 1  71 cm  -RE      Measurement Location 2  10 cm above knee  -RE      Left 2  67 cm  -RE      Right 2  74 cm  -RE      Measurement Location 3  Knee  -RE      Left 3  45 cm  -RE      Right 3  42 cm  -RE      Measurement Location 4  10 cm below knee  -RE      Left 4  45 cm  -RE      Right 4  44.5 cm  -RE      Measurement Location 5  20 cm below knee  -RE      Left 5  36 cm  -RE      Right 5  37 cm  -RE      Measurement Location 6  30 cm below knee  -RE      Left 6  28 cm  -RE      Right 6  27 cm  -RE      Measurement Location 7  Ankle  -RE      Left 7  26.5 cm  -RE      Right 7  26.5 cm  -RE      Measurement Location 8  Mid foot  -RE      Left 8  24 cm  -RE      Right 8  24 cm  -RE      LLE Circumferential Total  335.5 cm  -RE      RLE Circumferential Total  346 cm  -RE        User Key  (r) = Recorded By, (t) = Taken By, (c) = Cosigned By    Initials Name Provider Type    Randa Momin OTR Occupational Therapist                  Therapy Education  Education Details: discussed program and plan.  Gave order form for bandages.  can assist. Discussed lymphedema precautions.   Given: Symptoms/condition management, Edema management  Program: New  How Provided: Verbal, Written  Provided to: Patient  Level of Understanding: Teach back education performed, Verbalized  83194 - OT Self Care/Mgmt  "Minutes: 30            OT Goals     Row Name 11/15/18 1600          OT Short Term Goals    STG Date to Achieve  11/29/18  -RE     STG 1  Patient independent and compliant with self-wrapping techniques of compression bandages with assistance of caregiver as needed for improved self-management of condition.  -RE     STG 1 Progress  New  -RE     STG 2  Patient will demonstrate proper awareness of \"What is Lymphedema?\" and \"Healthy Habits\" for improved prevention, management, care of symptoms and ease of transition to self-care of condition.   -RE     STG 2 Progress  New  -RE     STG 3   Patient will demonstrate decreased net edema of bilateral lower extremities >/= 15-25cm  for decrease in edema, symptoms, decreased risk of infection and improved skin care/transition to self-care of condition.   -RE     STG 3 Progress  New  -RE        Long Term Goals    LTG Date to Achieve  12/13/18  -RE     LTG 1   Patient will demonstrate decreased net edema of bilateral lower extremities >/= 26-35cm  for decrease in edema, symptoms, decreased risk of infection and improved skin care/transition to self-care of condition.   -RE     LTG 1 Progress  New  -RE     LTG 2  Patient independent and compliant with initial home exercise program focused on diaphragmatic breathing, range of motion, flexibility to decrease edema and improve lymphatic flow for decreased edema and decreased risk of infection.   -RE     LTG 2 Progress  New  -RE     LTG 3  Patient independent and compliant with use and care of compression garments with assistance of a caregiver as needed to promote self-care independence.   -RE     LTG 3 Progress  New  -RE        Time Calculation    OT Goal Re-Cert Due Date  02/15/19  -RE       User Key  (r) = Recorded By, (t) = Taken By, (c) = Cosigned By    Initials Name Provider Type    Randa Momin OTR Occupational Therapist          OT Assessment/Plan     Row Name 11/15/18 6815          OT Assessment    Functional " Limitations  Impaired locomotion;Impaired gait;Limitations in community activities;Limitation in home management;Performance in self-care ADL  -RE     Impairments  Edema;Pain;Range of motion;Impaired venous circulation;Impaired lymphatic circulation;Muscle strength  -RE     Assessment Comments  Pt presents with moderate to severe bilateral LE lymphedema.  Edema extends from toes to groin.  Edema is 2-3+.  She could benefit from Complete Decongestive Therapy to decrease edema, maintain skin integrity, decrease the risk of infection, and to learn self care strategies.    -RE     Please refer to paper survey for additional self-reported information  Yes  -RE     OT Diagnosis  bilateral LE lymphedema  -RE     OT Rehab Potential  Good  -RE     Patient/caregiver participated in establishment of treatment plan and goals  Yes  -RE     Patient would benefit from skilled therapy intervention  Yes  -RE        OT Plan    OT Frequency  3x/week;4x/week  -RE     Predicted Duration of Therapy Intervention (Therapy Eval)  4-6 weeks  -RE     Planned Therapy Interventions (Optional Details)  home exercise program;manual therapy techniques;patient/family education;other (see comments) compression bandaging and skin care  -RE       User Key  (r) = Recorded By, (t) = Taken By, (c) = Cosigned By    Initials Name Provider Type    RE Randa Mata OTR Occupational Therapist                    Time Calculation:   OT Start Time: 1415  OT Stop Time: 1530  OT Time Calculation (min): 75 min  Total Timed Code Minutes- OT: 30 minute(s)     Therapy Suggested Charges     Code   Minutes Charges    07107 (CPT®) Hc Ot Neuromusc Re Education Ea 15 Min      46774 (CPT®) Hc Ot Ther Proc Ea 15 Min      39024 (CPT®) Hc Ot Therapeutic Act Ea 15 Min      84422 (CPT®) Hc Ot Manual Therapy Ea 15 Min      81613 (CPT®) Hc Ot Iontophoresis Ea 15 Min      53860 (CPT®) Hc Ot Elec Stim Ea-Per 15 Min      28586 (CPT®) Hc Ot Ultrasound Ea 15 Min      33752 (CPT®) Hc  Ot Self Care/Mgmt/Train Ea 15 Min 30 2     (CPT®) Hc Ot Electrical Stim Unattended      Total  30 2          Therapy Charges for Today     Code Description Service Date Service Provider Modifiers Qty    51813928419 HC OT SELF CARE/MGMT/TRAIN EA 15 MIN 11/15/2018 Randa Mata OTR GO 2    57995893159 HC OT EVAL LOW COMPLEXITY 3 11/15/2018 Randa Mata OTR GO 1                    JOEY Garcia  11/15/2018

## 2020-02-05 ENCOUNTER — HOSPITAL ENCOUNTER (OUTPATIENT)
Dept: GENERAL RADIOLOGY | Facility: HOSPITAL | Age: 65
Discharge: HOME | End: 2020-02-05
Admitting: PHYSICIAN ASSISTANT

## 2020-02-05 ENCOUNTER — TRANSCRIBE ORDERS (OUTPATIENT)
Dept: ADMINISTRATIVE | Facility: HOSPITAL | Age: 65
End: 2020-02-05

## 2020-02-05 DIAGNOSIS — Z11.1 SCREENING-PULMONARY TB: Primary | ICD-10-CM

## 2020-02-05 DIAGNOSIS — Z11.1 SCREENING-PULMONARY TB: ICD-10-CM

## 2020-02-05 PROCEDURE — 71046 X-RAY EXAM CHEST 2 VIEWS: CPT

## 2021-04-05 ENCOUNTER — OFFICE (OUTPATIENT)
Dept: URBAN - METROPOLITAN AREA CLINIC 75 | Facility: CLINIC | Age: 66
End: 2021-04-05

## 2021-04-05 VITALS
TEMPERATURE: 97 F | HEIGHT: 66 IN | DIASTOLIC BLOOD PRESSURE: 80 MMHG | WEIGHT: 173 LBS | SYSTOLIC BLOOD PRESSURE: 120 MMHG

## 2021-04-05 DIAGNOSIS — Z12.11 ENCOUNTER FOR SCREENING FOR MALIGNANT NEOPLASM OF COLON: ICD-10-CM

## 2021-04-05 DIAGNOSIS — K21.9 GASTRO-ESOPHAGEAL REFLUX DISEASE WITHOUT ESOPHAGITIS: ICD-10-CM

## 2021-04-05 DIAGNOSIS — R11.2 NAUSEA WITH VOMITING, UNSPECIFIED: ICD-10-CM

## 2021-04-05 DIAGNOSIS — K59.03 DRUG INDUCED CONSTIPATION: ICD-10-CM

## 2021-04-05 PROCEDURE — 99204 OFFICE O/P NEW MOD 45 MIN: CPT | Performed by: INTERNAL MEDICINE

## 2021-04-05 NOTE — SERVICENOTES
CT scan done last February shows bilateral hydronephrosis.  Status post gastric bypass with some dilatation of the esophagus. uterine fibroids.

## 2021-08-26 ENCOUNTER — HOSPITAL ENCOUNTER (OUTPATIENT)
Dept: GENERAL RADIOLOGY | Facility: HOSPITAL | Age: 66
Discharge: HOME | End: 2021-08-26
Admitting: PHYSICIAN ASSISTANT

## 2021-08-26 DIAGNOSIS — Z11.1 SCREENING-PULMONARY TB: ICD-10-CM

## 2021-08-26 PROCEDURE — 71046 X-RAY EXAM CHEST 2 VIEWS: CPT

## 2021-08-30 ENCOUNTER — TRANSCRIBE ORDERS (OUTPATIENT)
Dept: ADMINISTRATIVE | Facility: HOSPITAL | Age: 66
End: 2021-08-30

## 2021-08-30 DIAGNOSIS — R93.89 ABNORMAL CHEST X-RAY: ICD-10-CM

## 2021-08-30 DIAGNOSIS — R01.1 MURMUR: Primary | ICD-10-CM

## 2021-10-01 ENCOUNTER — HOSPITAL ENCOUNTER (OUTPATIENT)
Dept: CT IMAGING | Facility: HOSPITAL | Age: 66
Discharge: HOME | End: 2021-10-01

## 2021-10-01 ENCOUNTER — HOSPITAL ENCOUNTER (OUTPATIENT)
Dept: CARDIOLOGY | Facility: HOSPITAL | Age: 66
Discharge: HOME | End: 2021-10-01

## 2021-10-01 VITALS
DIASTOLIC BLOOD PRESSURE: 62 MMHG | HEART RATE: 58 BPM | HEIGHT: 62 IN | WEIGHT: 210 LBS | SYSTOLIC BLOOD PRESSURE: 104 MMHG | BODY MASS INDEX: 38.64 KG/M2

## 2021-10-01 DIAGNOSIS — R01.1 MURMUR: ICD-10-CM

## 2021-10-01 DIAGNOSIS — R93.89 ABNORMAL CHEST X-RAY: ICD-10-CM

## 2021-10-01 LAB
AORTIC ARCH: 2 CM
AORTIC DIMENSIONLESS INDEX: 0.7 (DI)
ASCENDING AORTA: 2.4 CM
BH CV ECHO MEAS - AO ARCH DIAM (PROXIMAL TRANS.): 2 CM
BH CV ECHO MEAS - AO MAX PG (FULL): 6.8 MMHG
BH CV ECHO MEAS - AO MAX PG: 15.7 MMHG
BH CV ECHO MEAS - AO MEAN PG (FULL): 4 MMHG
BH CV ECHO MEAS - AO MEAN PG: 8 MMHG
BH CV ECHO MEAS - AO V2 MAX: 198 CM/SEC
BH CV ECHO MEAS - AO V2 MEAN: 134 CM/SEC
BH CV ECHO MEAS - AO V2 VTI: 45.6 CM
BH CV ECHO MEAS - ASC AORTA: 2.4 CM
BH CV ECHO MEAS - AVA(I,A): 1.9 CM^2
BH CV ECHO MEAS - AVA(I,D): 1.9 CM^2
BH CV ECHO MEAS - AVA(V,A): 2.1 CM^2
BH CV ECHO MEAS - AVA(V,D): 2.1 CM^2
BH CV ECHO MEAS - BSA(HAYCOCK): 2.1 M^2
BH CV ECHO MEAS - BSA: 2 M^2
BH CV ECHO MEAS - BZI_BMI: 38.4 KILOGRAMS/M^2
BH CV ECHO MEAS - BZI_METRIC_HEIGHT: 157.5 CM
BH CV ECHO MEAS - BZI_METRIC_WEIGHT: 95.3 KG
BH CV ECHO MEAS - EDV(CUBED): 79.5 ML
BH CV ECHO MEAS - EDV(MOD-SP2): 67 ML
BH CV ECHO MEAS - EDV(MOD-SP4): 92 ML
BH CV ECHO MEAS - EDV(TEICH): 83.1 ML
BH CV ECHO MEAS - EF(CUBED): 69.3 %
BH CV ECHO MEAS - EF(MOD-BP): 67.4 %
BH CV ECHO MEAS - EF(MOD-SP2): 53.7 %
BH CV ECHO MEAS - EF(MOD-SP4): 77.2 %
BH CV ECHO MEAS - EF(TEICH): 61.2 %
BH CV ECHO MEAS - ESV(CUBED): 24.4 ML
BH CV ECHO MEAS - ESV(MOD-SP2): 31 ML
BH CV ECHO MEAS - ESV(MOD-SP4): 21 ML
BH CV ECHO MEAS - ESV(TEICH): 32.2 ML
BH CV ECHO MEAS - FS: 32.6 %
BH CV ECHO MEAS - IVS/LVPW: 0.78
BH CV ECHO MEAS - IVSD: 0.7 CM
BH CV ECHO MEAS - LAT PEAK E' VEL: 10.3 CM/SEC
BH CV ECHO MEAS - LV DIASTOLIC VOL/BSA (35-75): 47.1 ML/M^2
BH CV ECHO MEAS - LV MASS(C)D: 105.3 GRAMS
BH CV ECHO MEAS - LV MASS(C)DI: 54 GRAMS/M^2
BH CV ECHO MEAS - LV MAX PG: 8.9 MMHG
BH CV ECHO MEAS - LV MEAN PG: 4 MMHG
BH CV ECHO MEAS - LV SYSTOLIC VOL/BSA (12-30): 10.8 ML/M^2
BH CV ECHO MEAS - LV V1 MAX: 149 CM/SEC
BH CV ECHO MEAS - LV V1 MEAN: 98.2 CM/SEC
BH CV ECHO MEAS - LV V1 VTI: 31.3 CM
BH CV ECHO MEAS - LVIDD: 4.3 CM
BH CV ECHO MEAS - LVIDS: 2.9 CM
BH CV ECHO MEAS - LVLD AP2: 6.6 CM
BH CV ECHO MEAS - LVLD AP4: 7.1 CM
BH CV ECHO MEAS - LVLS AP2: 6 CM
BH CV ECHO MEAS - LVLS AP4: 5.5 CM
BH CV ECHO MEAS - LVOT AREA (M): 2.8 CM^2
BH CV ECHO MEAS - LVOT AREA: 2.8 CM^2
BH CV ECHO MEAS - LVOT DIAM: 1.9 CM
BH CV ECHO MEAS - LVPWD: 0.9 CM
BH CV ECHO MEAS - MED PEAK E' VEL: 11.3 CM/SEC
BH CV ECHO MEAS - MV A DUR: 0.12 SEC
BH CV ECHO MEAS - MV A MAX VEL: 90.4 CM/SEC
BH CV ECHO MEAS - MV DEC SLOPE: 370.5 CM/SEC^2
BH CV ECHO MEAS - MV DEC TIME: 230 SEC
BH CV ECHO MEAS - MV E MAX VEL: 80.6 CM/SEC
BH CV ECHO MEAS - MV E/A: 0.89
BH CV ECHO MEAS - MV MAX PG: 2.7 MMHG
BH CV ECHO MEAS - MV MEAN PG: 1 MMHG
BH CV ECHO MEAS - MV P1/2T MAX VEL: 95 CM/SEC
BH CV ECHO MEAS - MV P1/2T: 75.1 MSEC
BH CV ECHO MEAS - MV V2 MAX: 81.8 CM/SEC
BH CV ECHO MEAS - MV V2 MEAN: 44 CM/SEC
BH CV ECHO MEAS - MV V2 VTI: 26.6 CM
BH CV ECHO MEAS - MVA P1/2T LCG: 2.3 CM^2
BH CV ECHO MEAS - MVA(P1/2T): 2.9 CM^2
BH CV ECHO MEAS - MVA(VTI): 3.3 CM^2
BH CV ECHO MEAS - PI END-D VEL: 81.4 CM/SEC
BH CV ECHO MEAS - PULM A REVS DUR: 0.16 SEC
BH CV ECHO MEAS - PULM A REVS VEL: 33 CM/SEC
BH CV ECHO MEAS - PULM DIAS VEL: 43.7 CM/SEC
BH CV ECHO MEAS - PULM S/D: 1.5
BH CV ECHO MEAS - PULM SYS VEL: 63.4 CM/SEC
BH CV ECHO MEAS - RAP SYSTOLE: 3 MMHG
BH CV ECHO MEAS - RV MAX PG: 1.9 MMHG
BH CV ECHO MEAS - RV MEAN PG: 1 MMHG
BH CV ECHO MEAS - RV V1 MAX: 69.2 CM/SEC
BH CV ECHO MEAS - RV V1 MEAN: 47.3 CM/SEC
BH CV ECHO MEAS - RV V1 VTI: 15.9 CM
BH CV ECHO MEAS - RVSP: 26 MMHG
BH CV ECHO MEAS - SI(CUBED): 28.2 ML/M^2
BH CV ECHO MEAS - SI(LVOT): 45.5 ML/M^2
BH CV ECHO MEAS - SI(MOD-SP2): 18.4 ML/M^2
BH CV ECHO MEAS - SI(MOD-SP4): 36.4 ML/M^2
BH CV ECHO MEAS - SI(TEICH): 26.1 ML/M^2
BH CV ECHO MEAS - SV(CUBED): 55.1 ML
BH CV ECHO MEAS - SV(LVOT): 88.7 ML
BH CV ECHO MEAS - SV(MOD-SP2): 36 ML
BH CV ECHO MEAS - SV(MOD-SP4): 71 ML
BH CV ECHO MEAS - SV(TEICH): 50.9 ML
BH CV ECHO MEAS - TAPSE (>1.6): 2.9 CM
BH CV ECHO MEAS - TR MAX PG: 23 MMHG
BH CV ECHO MEAS - TR MAX VEL: 238 CM/SEC
BH CV ECHO MEASUREMENTS AVERAGE E/E' RATIO: 7.46
BH CV XLRA - RV BASE: 2.4 CM
BH CV XLRA - RV LENGTH: 6.2 CM
BH CV XLRA - RV MID: 2 CM
BH CV XLRA - TDI S': 14.3 CM/SEC
LEFT ATRIUM VOLUME INDEX: 23.3 ML/M2
LV EF 2D ECHO EST: 67 %
MAXIMAL PREDICTED HEART RATE: 156 BPM
SINUS: 2.3 CM
STJ: 2.1 CM
STRESS TARGET HR: 133 BPM

## 2021-10-01 PROCEDURE — 93306 TTE W/DOPPLER COMPLETE: CPT

## 2021-10-01 PROCEDURE — 93306 TTE W/DOPPLER COMPLETE: CPT | Performed by: INTERNAL MEDICINE

## 2021-10-01 PROCEDURE — 71250 CT THORAX DX C-: CPT

## 2021-11-10 ENCOUNTER — TRANSCRIBE ORDERS (OUTPATIENT)
Dept: ADMINISTRATIVE | Facility: HOSPITAL | Age: 66
End: 2021-11-10

## 2021-11-10 DIAGNOSIS — R91.8 OTHER NONSPECIFIC ABNORMAL FINDING OF LUNG FIELD: Primary | ICD-10-CM

## 2021-12-13 ENCOUNTER — APPOINTMENT (OUTPATIENT)
Dept: CT IMAGING | Facility: HOSPITAL | Age: 66
End: 2021-12-13

## 2021-12-30 ENCOUNTER — HOSPITAL ENCOUNTER (OUTPATIENT)
Dept: CT IMAGING | Facility: HOSPITAL | Age: 66
Discharge: HOME | End: 2021-12-30
Admitting: PHYSICIAN ASSISTANT

## 2021-12-30 DIAGNOSIS — R91.8 OTHER NONSPECIFIC ABNORMAL FINDING OF LUNG FIELD: ICD-10-CM

## 2021-12-30 PROCEDURE — 71250 CT THORAX DX C-: CPT

## 2023-01-25 ENCOUNTER — TRANSCRIBE ORDERS (OUTPATIENT)
Dept: ADMINISTRATIVE | Facility: HOSPITAL | Age: 68
End: 2023-01-25
Payer: MEDICARE

## 2023-01-25 DIAGNOSIS — R06.00 DYSPNEA, UNSPECIFIED TYPE: Primary | ICD-10-CM

## 2023-01-25 DIAGNOSIS — R60.0 BILATERAL LEG EDEMA: ICD-10-CM
